# Patient Record
Sex: MALE | Race: WHITE | NOT HISPANIC OR LATINO | Employment: OTHER | URBAN - NONMETROPOLITAN AREA
[De-identification: names, ages, dates, MRNs, and addresses within clinical notes are randomized per-mention and may not be internally consistent; named-entity substitution may affect disease eponyms.]

---

## 2020-01-15 PROBLEM — I48.91 ATRIAL FIBRILLATION WITH RVR (HCC): Status: ACTIVE | Noted: 2020-01-15

## 2020-01-15 PROBLEM — D62 ACUTE BLOOD LOSS ANEMIA: Status: ACTIVE | Noted: 2020-01-15

## 2020-01-15 RX ORDER — METOPROLOL TARTRATE 100 MG/1
100 TABLET ORAL 2 TIMES DAILY
COMMUNITY
End: 2020-01-16 | Stop reason: SDUPTHER

## 2020-01-16 ENCOUNTER — OFFICE VISIT (OUTPATIENT)
Dept: CARDIOLOGY | Facility: CLINIC | Age: 65
End: 2020-01-16

## 2020-01-16 VITALS
OXYGEN SATURATION: 99 % | DIASTOLIC BLOOD PRESSURE: 71 MMHG | BODY MASS INDEX: 20.51 KG/M2 | SYSTOLIC BLOOD PRESSURE: 123 MMHG | HEIGHT: 72 IN | HEART RATE: 63 BPM | WEIGHT: 151.4 LBS

## 2020-01-16 DIAGNOSIS — E78.2 MIXED HYPERLIPIDEMIA: ICD-10-CM

## 2020-01-16 DIAGNOSIS — I48.91 ATRIAL FIBRILLATION WITH RVR (HCC): Primary | ICD-10-CM

## 2020-01-16 DIAGNOSIS — I72.8 CELIAC ARTERY ANEURYSM (HCC): ICD-10-CM

## 2020-01-16 DIAGNOSIS — I72.3 ILIAC ARTERY ANEURYSM (HCC): ICD-10-CM

## 2020-01-16 DIAGNOSIS — R00.2 PALPITATIONS: ICD-10-CM

## 2020-01-16 PROBLEM — R56.9 SEIZURES: Status: ACTIVE | Noted: 2020-01-16

## 2020-01-16 PROCEDURE — 93000 ELECTROCARDIOGRAM COMPLETE: CPT | Performed by: INTERNAL MEDICINE

## 2020-01-16 PROCEDURE — 99204 OFFICE O/P NEW MOD 45 MIN: CPT | Performed by: INTERNAL MEDICINE

## 2020-01-16 RX ORDER — METOPROLOL TARTRATE 50 MG/1
50 TABLET, FILM COATED ORAL 2 TIMES DAILY
Qty: 60 TABLET | Refills: 11 | Status: SHIPPED | OUTPATIENT
Start: 2020-01-16 | End: 2020-03-05 | Stop reason: SDUPTHER

## 2020-01-16 RX ORDER — PHENYTOIN SODIUM 100 MG/1
400 CAPSULE, EXTENDED RELEASE ORAL DAILY
COMMUNITY
Start: 2019-10-17

## 2020-01-16 RX ORDER — HYDROCODONE BITARTRATE AND ACETAMINOPHEN 5; 325 MG/1; MG/1
1 TABLET ORAL EVERY 4 HOURS PRN
COMMUNITY
Start: 2020-01-03 | End: 2020-03-05

## 2020-01-16 RX ORDER — POLYETHYLENE GLYCOL 3350 17 G/17G
17 POWDER, FOR SOLUTION ORAL DAILY
COMMUNITY

## 2020-01-16 NOTE — PROGRESS NOTES
Loli Nuñez is a 64 y.o. male     Chief Complaint   Patient presents with   • Establish Care     Here for hosp. f/u   • Atrial Fibrillation       PROBLEM LIST:     1. A-Fib, s/p spleenectomy. [id number] score 1  2. Hyperlipidemia  3. 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 Palpitations  5. Seizures                Specialty Problems        Cardiology Problems    Atrial fibrillation with RVR (CMS/[facility])                HPI:  Mr. Nuñez is a 64-year-old male patient of Dr. Garces seen today for atrial fibrillation.    The patient describes an episode of atrial fibrillation in the very distant past.  Since that time he has had only infrequent palpitations.  The patient presented to [facility] with a ruptured spleen of unknown etiology.  Radiographic imaging suggested a celiac aneurysm but this was not described in the patient's operative note.  Postoperatively the developed atrial fibrillation which was rate control and that which spontaneously converted.  Since that time he had no significant palpitations.    Mr. Nuñez denies pain, orthopnea, PND, or lower extremity edema.  He has had no dizziness, presyncope, or syncope.  He has had no symptoms of TIA or stroke and he has had no bleeding.  Chads 2 vascular 2 score is 0 (if the patient is felt to be not hypertensive) or 1 for hypertension which is now well controlled on single dose therapy.                    PRIOR MEDICATIONS    Current Outpatient Medications on File Prior to Visit   Medication Sig Dispense Refill   • aspirin 81 MG tablet Take 81 mg by mouth. Takes 4 x a week     • HYDROcodone-acetaminophen (NORCO) 5-325 MG per tablet Take 1 tablet by mouth Every 4 (Four) Hours As Needed. for pain     • metoprolol tartrate (LOPRESSOR) 100 MG tablet Take 100 mg by mouth 2 (Two) Times a Day.     • phenytoin (DILANTIN) 100 MG ER capsule Takes 400/400/300 alternating days       No current facility-administered  medications on file prior to visit.        ALLERGIES:    Doxycycline    PAST MEDICAL HISTORY:    Past Medical History:   Diagnosis Date   • Aneurysm (CMS/HCC)    • Atrial fibrillation (CMS/HCC)    • Hyperlipidemia    • Iliac artery aneurysm (CMS/HCC)        SURGICAL HISTORY:    Past Surgical History:   Procedure Laterality Date   • CARDIAC CATHETERIZATION     • SPLENECTOMY  12/30/2019   • TONSILLECTOMY AND ADENOIDECTOMY         SOCIAL HISTORY:    Social History     Socioeconomic History   • Marital status:      Spouse name: Not on file   • Number of children: Not on file   • Years of education: Not on file   • Highest education level: Not on file   Tobacco Use   • Smoking status: Current Some Day Smoker     Types: Cigarettes   • Smokeless tobacco: Former User   • Tobacco comment: smokes approx. < 1/2 PPD for approx. 30 yrs.   Substance and Sexual Activity   • Alcohol use: Yes     Comment: occas. beer   • Drug use: Never       FAMILY HISTORY:    Family History   Problem Relation Age of Onset   • Lung cancer Mother    • Osteoporosis Mother    • Heart attack Father    • Coronary artery disease Father        Review of Systems   Constitutional: Positive for fatigue. Negative for chills, diaphoresis, fever and unexpected weight change.   HENT:        Chronic sinus issues   Eyes: Positive for visual disturbance (wears glasses).   Respiratory: Negative.         Denies orthopnea/PND   Cardiovascular: Positive for palpitations (HX a-fib). Negative for chest pain and leg swelling.   Gastrointestinal: Positive for constipation. Negative for blood in stool (no melena,hematuria,hemoptysis,hematochezia).   Endocrine: Negative.    Genitourinary: Negative.    Musculoskeletal: Positive for arthralgias and myalgias.        Denies leg cramps with ambulation   Skin: Negative.    Allergic/Immunologic: Positive for environmental allergies.   Neurological: Negative.    Hematological: Negative.    Psychiatric/Behavioral: Negative.     "      VISIT VITALS:  Vitals:    01/16/20 1044   BP: 123/71   BP Location: Left arm   Patient Position: Sitting   Pulse: 63   SpO2: 99%   Weight: 68.7 kg (151 lb 6.4 oz)   Height: 182.9 cm (72\")      /71 (BP Location: Left arm, Patient Position: Sitting)   Pulse 63   Ht 182.9 cm (72\")   Wt 68.7 kg (151 lb 6.4 oz)   SpO2 99%   BMI 20.53 kg/m²     RECENT LABS:    Objective       Physical Exam   Constitutional: He is oriented to person, place, and time. He appears well-developed and well-nourished. No distress.   HENT:   Head: Normocephalic and atraumatic.   Eyes: Pupils are equal, round, and reactive to light. Conjunctivae and EOM are normal.   Neck: Normal range of motion. Neck supple. No hepatojugular reflux and no JVD present. Carotid bruit is not present. No tracheal deviation present.   Nl. Carotid upstrokes   Cardiovascular: Normal rate, regular rhythm, S1 normal, S2 normal and intact distal pulses. Exam reveals gallop and S4. Exam reveals no S3 and no friction rub.   No murmur heard.  Pulses:       Radial pulses are 2+ on the right side, and 2+ on the left side.   Pulmonary/Chest: Effort normal. He has decreased breath sounds. He has no wheezes. He has no rhonchi. He has no rales.   Nl. Expir. Phase  Minimally decreased Breath sound intensity   Abdominal: Soft. Bowel sounds are normal. He exhibits no distension, no abdominal bruit and no mass. There is no tenderness. There is no rebound and no guarding.   No organomegaly   Musculoskeletal: Normal range of motion. He exhibits no edema, tenderness or deformity.   LLE, no edema, palpable pedal pulses  RLE, no edema, palpable pedal pulses   Neurological: He is alert and oriented to person, place, and time.   Skin: Skin is warm and dry. No rash noted. No erythema. No pallor.   Psychiatric: He has a normal mood and affect. His behavior is normal. Judgment and thought content normal.   Nursing note and vitals reviewed.        ECG 12 Lead  Date/Time: " 1/16/2020 12:00 PM  Performed by: Torsten Pretty MD  Authorized by: Torsten Pretty MD   Comparison: not compared with previous ECG   Previous ECG: no previous ECG available  Comments: Normal sinus rhythm at 60 bpm.  Within normal limits.              Assessment/Plan     1. A-Fib with RVR.  The patient is currently in sinus rhythm.  I would like to perform a 30-day event monitor to assess his dysrhythmic burden.      2. Celiac Artery Aneuyrsm.  No aneurysm was described at the time of surgery.  I would defer further evaluation to Ally Perez and Dr. Jimenez.      3. Mixed Hyperlipidemia    4. Plapitations.  We will evaluate palpitations with event monitoring as above.    5.  The patient describes energy and fatigue on very high-dose beta-blocker.  We will reduce metoprolol to 50 mg twice daily and follow heart rate and blood pressures closely.       #6.  Mr. Martinez was asked to follow-up with Ally Perez and Tony as instructed, and in our office after testing or on a as needed basis for heart rate, blood pressure, or symptoms as discussed.    No follow-ups on file.         Jonah Nuñez  reports that he has been smoking cigarettes. He has quit using smokeless tobacco.. I have educated him on the risk of diseases from using tobacco products such as cancer, COPD and heart diease.     I advised him to quit and he is not willing to quit.    I spent 3  minutes counseling the patient.          Patient's Body mass index is 20.53 kg/m². BMI is within normal parameters. No follow-up required..       Natty Valladares LPN    Scribed for Dr. Torsten Pretty by Natty Valladares LPN January 16, 2020 11:42 AM         Electronically signed by:            This note is dictated utilizing voice recognition software.  Although this record has been proof read, transcriptional errors may still be present. If questions occur regarding the content of this record please do not hesitate to call our office.

## 2020-01-23 ENCOUNTER — TELEPHONE (OUTPATIENT)
Dept: CARDIOLOGY | Facility: CLINIC | Age: 65
End: 2020-01-23

## 2020-01-23 NOTE — TELEPHONE ENCOUNTER
CRITICAL EVENT MONITOR REPORTS RECEIVED IN OFFICE TODAY FROM 1- AT 4:23 AM CT AND 1- 8:50 AM CT WITH A-FIB , FLUTTER, RVR REVIEWED BY DR. CARRILLO. PER DR. CARRILLO MAKE SURE NOT HAVING ANY STROKE LIKE SX'S, IF IS NEEDS APPT. IF NOT MONITOR, SINCE A-FIB SCORE ONLY 1. 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 HE IS TO BE EVALUATED IMMEDIATELY WITH ANY CHANGE IN NEURO./CVA LIKE SX'S AND NOTIFY OUR OFFICE. VERBALIZED HE UNDERSTOOD. PH,LPN

## 2020-01-27 ENCOUNTER — OFFICE VISIT (OUTPATIENT)
Dept: CARDIOLOGY | Facility: CLINIC | Age: 65
End: 2020-01-27

## 2020-01-27 VITALS
DIASTOLIC BLOOD PRESSURE: 73 MMHG | HEIGHT: 72 IN | BODY MASS INDEX: 20.99 KG/M2 | HEART RATE: 71 BPM | SYSTOLIC BLOOD PRESSURE: 122 MMHG | OXYGEN SATURATION: 99 % | WEIGHT: 155 LBS

## 2020-01-27 DIAGNOSIS — R00.2 PALPITATIONS: ICD-10-CM

## 2020-01-27 DIAGNOSIS — I48.91 ATRIAL FIBRILLATION WITH RVR (HCC): Primary | ICD-10-CM

## 2020-01-27 DIAGNOSIS — I45.5 SINUS PAUSE: ICD-10-CM

## 2020-01-27 PROCEDURE — 99214 OFFICE O/P EST MOD 30 MIN: CPT | Performed by: NURSE PRACTITIONER

## 2020-01-27 NOTE — PATIENT INSTRUCTIONS
Steps to Quit Smoking    Smoking tobacco can be bad for your health. It can also affect almost every organ in your body. Smoking puts you and people around you at risk for many serious long-lasting (chronic) diseases. Quitting smoking is hard, but it is one of the best things that you can do for your health. It is never too late to quit.  What are the benefits of quitting smoking?  When you quit smoking, you lower your risk for getting serious diseases and conditions. They can include:  · Lung cancer or lung disease.  · Heart disease.  · Stroke.  · Heart attack.  · Not being able to have children (infertility).  · Weak bones (osteoporosis) and broken bones (fractures).  If you have coughing, wheezing, and shortness of breath, those symptoms may get better when you quit. You may also get sick less often. If you are pregnant, quitting smoking can help to lower your chances of having a baby of low birth weight.  What can I do to help me quit smoking?  Talk with your doctor about what can help you quit smoking. Some things you can do (strategies) include:  · Quitting smoking totally, instead of slowly cutting back how much you smoke over a period of time.  · Going to in-person counseling. You are more likely to quit if you go to many counseling sessions.  · Using resources and support systems, such as:  ? Online chats with a counselor.  ? Phone quitlines.  ? Printed self-help materials.  ? Support groups or group counseling.  ? Text messaging programs.  ? Mobile phone apps or applications.  · Taking medicines. Some of these medicines may have nicotine in them. If you are pregnant or breastfeeding, do not take any medicines to quit smoking unless your doctor says it is okay. Talk with your doctor about counseling or other things that can help you.  Talk with your doctor about using more than one strategy at the same time, such as taking medicines while you are also going to in-person counseling. This can help make  quitting easier.  What things can I do to make it easier to quit?  Quitting smoking might feel very hard at first, but there is a lot that you can do to make it easier. Take these steps:  · Talk to your family and friends. Ask them to support and encourage you.  · Call phone quitlines, reach out to support groups, or work with a counselor.  · Ask people who smoke to not smoke around you.  · Avoid places that make you want (trigger) to smoke, such as:  ? Bars.  ? Parties.  ? Smoke-break areas at work.  · Spend time with people who do not smoke.  · Lower the stress in your life. Stress can make you want to smoke. Try these things to help your stress:  ? Getting regular exercise.  ? Deep-breathing exercises.  ? Yoga.  ? Meditating.  ? Doing a body scan. To do this, close your eyes, focus on one area of your body at a time from head to toe, and notice which parts of your body are tense. Try to relax the muscles in those areas.  · Download or buy apps on your mobile phone or tablet that can help you stick to your quit plan. There are many free apps, such as QuitGuide from the CDC (Centers for Disease Control and Prevention). You can find more support from smokefree.gov and other websites.  This information is not intended to replace advice given to you by your health care provider. Make sure you discuss any questions you have with your health care provider.  Document Released: 10/14/2010 Document Revised: 08/15/2017 Document Reviewed: 05/03/2016  [employer] Interactive Patient Education © 2019 [employer]    Acute Coronary Syndrome    Acute coronary syndrome (ACS) is a serious problem in which there is suddenly not enough blood and oxygen reaching the heart. ACS can result in chest pain or a heart attack.  This condition is a medical emergency. If you have any symptoms of this condition, get help right away.  What are the causes?  This condition may be caused by:  · Buildup of fat and cholesterol inside of the arteries  (atherosclerosis). This is the most common cause. The buildup (plaque) can cause blood vessels in the heart (coronary arteries) to become narrow or blocked, which reduces blood flow to the heart. Plaque can also break off and lead to a clot, which can block an artery and cause a heart attack or stroke.  · Sudden tightening of the muscles around the coronary arteries (coronary spasm).  · Tearing of a coronary artery (spontaneous coronary artery dissection).  · Very low blood pressure (hypotension).  · An abnormal heartbeat (arrhythmia).  · Other medical conditions that cause a decrease of oxygen to the heart, such as anemiaorrespiratory failure.  · Using cocaine or methamphetamine.  What increases the risk?  The following factors may make you more likely to develop this condition:  · Age. The risk for ACS increases as you get older.  · History of chest pain, heart attack, peripheral artery disease, or stroke.  · Having taken chemotherapy or immune-suppressing medicines.  · Being male.  · Family history of chest pain, heart disease, or stroke.  · Smoking.  · Not exercising enough.  · Being overweight.  · High cholesterol.  · High blood pressure (hypertension).  · Diabetes.  · Excessive alcohol use.  What are the signs or symptoms?  Common symptoms of this condition include:  · Chest pain. The pain may last a long time, or it may stop and come back (recur). It may feel like:  ? Crushing or squeezing.  ? Tightness, pressure, fullness, or heaviness.  · Arm, neck, jaw, or back pain.  · Heartburn or indigestion.  · Shortness of breath.  · Nausea.  · Sudden cold sweats.  · Light-headedness.  · Dizziness, or passing out.  · Tiredness (fatigue).  Sometimes there are no symptoms.  How is this diagnosed?  This condition may be diagnosed based on:  · Your medical history and symptoms.  · An electrocardiogram (ECG). This imaging test measures the heart's electrical activity.  · Blood tests. Cardiac blood tests may need to be  repeated at designated time intervals.  · Chest X-ray.  · A CT scan of the chest.  · A coronary angiogram. This is a procedure in which dye is injected into the bloodstream and then X-rays are taken to show if there is a blockage in a coronary artery.  · Exercise stress testing.  · Echocardiography. This is a test that uses sound waves to produce detailed images of the heart.  How is this treated?  The treatment is to restore blood flow to the heart as soon as possible. Treatment for this condition may include:  · Oxygen therapy.  · Medicines, such as:  ? Antiplatelet medicines and blood-thinning medicines, such as aspirin. These help prevent blood clots.  ? Medicine that dissolves any blood clots (fibrinolytic therapy).  ? Blood pressure medicines.  ? Nitroglycerin. This helps relieve chest pain and widens blood vessels to improve blood flow.  ? Pain medicine.  ? Cholesterol-lowering medicine.  · Surgery, such as:  ? Coronary angioplasty with stent placement. This involves placing a small piece of metal that looks like mesh or a spring into a narrow coronary artery. This widens the artery and keep it open.  ? Coronary artery bypass surgery. This involves taking a section of a blood vessel from a different part of your body, and placing it on the blocked coronary artery to allow blood to flow around (bypass) the blockage.  · Cardiac rehabilitation. This is a program that helps improve your health and well-being. It includes exercise training, education, and counseling to help you recover.  Follow these instructions at home:  Eating and drinking  · Eat a heart-healthy diet that includes whole grains, fruits and vegetables, lean proteins, and low-fat or nonfat dairy products.  · Limit how much salt (sodium) you eat as told by your health care provider. Follow instructions from your health care provider about any other eating or drinking restrictions, such as limiting foods that are high in fat and processed  sugars.  · Use healthy cooking methods such as roasting, grilling, broiling, baking, poaching, steaming, or stir-frying.  · Talk with a dietitian to learn about healthy cooking methods and how to eat less sodium.  Medicines  · Take over-the-counter and prescription medicines only as told by your health care provider.  · Do not take these medicines unless your health care provider approves:  ? Vitamin supplements that contain vitamin A or vitamin E.  ? Nonsteroidal anti-inflammatory drugs (NSAIDs), such as ibuprofen, naproxen, or celecoxib.  ? Hormone replacement therapy that contains estrogen.  If you are taking blood thinners:  · Talk with your health care provider before you take any medicines that contain aspirin or NSAIDs. These medicines increase your risk for dangerous bleeding.  · Take your medicine exactly as told, at the same time every day.  · Avoid activities that could cause injury or bruising, and follow instructions about how to prevent falls.  · Wear a medical alert bracelet, and carry a card that lists what medicines you take.  Activity  · Join a cardiac rehabilitation program. An exercise plan will be developed for you.  · Ask your health care provider:  ? What activities and exercises are safe for you.  ? If you should follow specific instructions about lifting, driving, or climbing stairs.  Lifestyle  · Do not use any products that contain nicotine or tobacco, such as cigarettes and e-cigarettes. If you need help quitting, ask your health care provider.  · If your health care provider says that alcohol is safe for you, limit your alcohol intake to no more than 1 drink a day. One drink equals 12 oz of beer, 5 oz of wine, or 1½ oz of hard liquor.  · Maintain a healthy weight. If you need to lose weight, work with your health care provider to do so safely.  General instructions  · Tell all the health care providers who care for you about your heart condition, including your dentist. This may affect  the medicines or treatment you receive.  · Manage any other health conditions you have, such as hypertension or diabetes. These conditions affect your heart.  · Learn ways to manage stress.  · Get screened for depression, and get mental health treatment if you need it. People with ACS are at higher risk for depression.  · Keep your vaccinations up to date. Get the flu shot (influenza vaccine) every year.  · If directed, monitor your blood pressure at home.  · Keep all follow-up visits as told by your health care provider. This is important.  Contact a health care provider if:  · You feel overwhelmed or sad.  · You have trouble doing your daily activities.  Get help right away if:  · You have pain in your chest, neck, arm, jaw, stomach, or back that recurs, and:  ? It lasts for more than a few minutes.  ? It is not relieved by taking the medicineyour health care provider prescribed.  · You have unexplained:  ? Heavy sweating.  ? Heartburn or indigestion.  ? Nausea or vomiting.  ? Shortness of breath.  ? Difficulty breathing.  ? Fatigue.  ? Nervousness or anxiety.  ? Weakness.  ? Diarrhea.  ? Dark stools or blood in your stool.  · You have sudden light-headedness or dizziness.  · Your blood pressure is higher than 180/120.  · You faint.  · You have thoughts about hurting yourself.  These symptoms may represent a serious problem that is an emergency. Do not wait to see if the symptoms will go away. Get medical help right away. Call your local emergency services (881 in the [place] Do not drive yourself to the hospital.   If you ever feel like you may hurt yourself or others, or have thoughts about taking your own life, get help right away. You can go to your nearest emergency department or call:  · Emergency services (805 in the [place]  · A suicide crisis helpline, such as the National Suicide Prevention Lifeline at 1-223.543.7200. This is open 24 hours a day.  Summary  · Acute coronary syndrome (ACS) is when there is  not enough blood and oxygen being supplied to the heart. ACS can result in chest pain or a heart attack.  · Acute coronary syndrome is a medical emergency. If you have any symptoms of this condition, get help right away.  · Treatment includes medicines and procedures to open the blocked arteries and restore blood flow.  This information is not intended to replace advice given to you by your health care provider. Make sure you discuss any questions you have with your health care provider.  Document Released: 12/18/2006 Document Revised: 08/28/2018 Document Reviewed: 08/28/2018  [employer] Interactive Patient Education © 2019 [employer]

## 2020-01-27 NOTE — PROGRESS NOTES
Loli Nuñez is a 64 y.o. male who presents to day for Atrial Fibrillation (Here for a follow up on cardiac monitor ).    CHIEF COMPLIANT  Chief Complaint   Patient presents with   • Atrial Fibrillation     Here for a follow up on cardiac monitor        Active Problems:  Problem List Items Addressed This Visit        Cardiovascular and Mediastinum    Atrial fibrillation with RVR (CMS/[facility]) - Primary    Relevant Orders    Ambulatory Referral to Cardiac Electrophysiology    Palpitations    Relevant Orders    Ambulatory Referral to Cardiac Electrophysiology    Sinus pause    Relevant Orders    Ambulatory Referral to Cardiac Electrophysiology          HPI  HPI  Mr. Gaines is a 64-year-old male who is being followed up today for atrial fibrillation and palpitations.  Patient was called in earlier than scheduled follow-up due to a 3.2-second pause in which was noted on his cardiac event monitor.  Patient also had atrial fibrillation with RVR that was noted on the event monitor.  Additional pauses were also noted but were less than 3 seconds.  Patient states during the episode of the 3.2-second pause it sort of stunned him he said he did not really know how to describe it he did not really have any shortness of breath just had a very weird feeling he did not lose consciousness or have a syncope episode but was mildly lightheaded.  This happened during the daytime hours at approximately 1244.  Patient also reports shortness of breath but only with moderate exertion.  Never gets short of breath at rest.   Patient did have atrial fibrillation and has a Keshav Vascor of 1.  Patient states that he gets rare palpitations but nothing on a persistent basis.  Patient also reports fatigue in which he stays tired.  Patient denies any chest pain, lower extremity edema, PND, orthopnea, decreased exercise tolerance, syncope, or neurological changes.    PRIOR MEDS  Current Outpatient Medications on File Prior to Visit      Medication Sig Dispense Refill   • aspirin 81 MG tablet Take 81 mg by mouth. Takes 4 x a week     • HYDROcodone-acetaminophen (NORCO) 5-325 MG per tablet Take 1 tablet by mouth Every 4 (Four) Hours As Needed. for pain     • metoprolol tartrate (LOPRESSOR) 50 MG tablet Take 1 tablet by mouth 2 (Two) Times a Day. 60 tablet 11   • phenytoin (DILANTIN) 100 MG ER capsule Takes 400/400/300 alternating days     • polyethylene glycol (MIRALAX) packet Take 17 g by mouth Daily.       No current facility-administered medications on file prior to visit.        ALLERGIES  Doxycycline    HISTORY  Past Medical History:   Diagnosis Date   • Aneurysm (CMS/HCC)    • Atrial fibrillation (CMS/HCC)    • Hyperlipidemia    • Iliac artery aneurysm (CMS/HCC)        Social History     Socioeconomic History   • Marital status:      Spouse name: Not on file   • Number of children: Not on file   • Years of education: Not on file   • Highest education level: Not on file   Tobacco Use   • Smoking status: Current Some Day Smoker     Types: Cigarettes   • Smokeless tobacco: Former User   • Tobacco comment: smokes approx. < 1/2 PPD for approx. 30 yrs.   Substance and Sexual Activity   • Alcohol use: Yes     Comment: occas. beer   • Drug use: Never       Family History   Problem Relation Age of Onset   • Lung cancer Mother    • Osteoporosis Mother    • Heart attack Father    • Coronary artery disease Father        Review of Systems   Constitutional: Positive for fatigue (stays tired ). Negative for chills and fever.   HENT: Positive for congestion.    Eyes: Positive for visual disturbance (glasses daily ).   Respiratory: Positive for shortness of breath (SOA with moderate exertion). Negative for chest tightness.    Cardiovascular: Positive for palpitations (occasional palps ). Negative for chest pain and leg swelling.   Gastrointestinal: Negative.  Negative for abdominal pain, blood in stool, nausea and vomiting.   Endocrine: Positive for  "cold intolerance. Negative for heat intolerance.   Genitourinary: Negative.  Negative for dysuria, frequency, hematuria and urgency.   Musculoskeletal: Positive for arthralgias (hands ). Negative for back pain.   Skin: Negative.  Negative for rash and wound.   Allergic/Immunologic: Negative.  Negative for environmental allergies and food allergies.   Neurological: Positive for light-headedness (a couple of times yesterday ). Negative for dizziness and weakness.   Hematological: Negative.  Does not bruise/bleed easily.   Psychiatric/Behavioral: Negative.  Negative for sleep disturbance (denies waking with smothering ).       Objective     VITALS: /73 (BP Location: Left arm, Patient Position: Sitting)   Pulse 71   Ht 182.9 cm (72\")   Wt 70.3 kg (155 lb)   SpO2 99%   BMI 21.02 kg/m²     LABS:   Lab Results (most recent)     None          IMAGING:   No Images in the past 120 days found..    EXAM:  Physical Exam   Constitutional: He is oriented to person, place, and time. He appears well-developed and well-nourished.   HENT:   Head: Normocephalic and atraumatic.   Eyes: Pupils are equal, round, and reactive to light. EOM are normal.   Neck: Neck supple. No JVD present. Carotid bruit is not present.   Cardiovascular: Normal rate, regular rhythm, normal heart sounds and intact distal pulses. Exam reveals no gallop.   No murmur heard.  Pulses:       Carotid pulses are 2+ on the right side, and 2+ on the left side.       Radial pulses are 2+ on the right side, and 2+ on the left side.        Posterior tibial pulses are 2+ on the right side, and 2+ on the left side.   Pulmonary/Chest: Effort normal. No respiratory distress. He has decreased breath sounds.   Abdominal: Soft. Bowel sounds are normal. He exhibits no distension. There is no tenderness.   Musculoskeletal: Normal range of motion. He exhibits no edema.   Neurological: He is alert and oriented to person, place, and time. He has normal strength. No cranial " nerve deficit or sensory deficit.   Skin: Skin is warm, dry and intact.   Psychiatric: He has a normal mood and affect. His speech is normal and behavior is normal. Judgment and thought content normal. Cognition and memory are normal.   Nursing note and vitals reviewed.      Procedure   Procedures       Assessment/Plan    Diagnosis Plan   1. Atrial fibrillation with RVR (CMS/HCC)  Ambulatory Referral to Cardiac Electrophysiology   2. Palpitations  Ambulatory Referral to Cardiac Electrophysiology   3. Sinus pause  Ambulatory Referral to Cardiac Electrophysiology   1.  Patient had a Keshav Vas 2 score of 1 and does have a history of atrial fibrillation.  Did discuss anticoagulation therapy and and due to a score of 1 would not initiate anticoagulation therapy at this time.  2.  Patient did have a 3.2-second pause during daytime hours in which he did become symptomatic with but not to the point of syncope.  Due to this I think it is appropriate to refer patient to Dr. Mary for evaluation of pacemaker placement.  Patient agreed.  3.  Patient otherwise relatively asymptomatic other than some shortness of air with moderate exertion and persistent fatigue.  No medication changes are warranted at this time.  We will continue metoprolol therapy due to the fact that patient has significant tachycardia as well as the pulse.  4.  Informed of signs and symptoms of ACS and advised to seek emergent treatment for any new worsening symptoms.  Patient also advised sooner follow-up as needed.    Return in about 4 weeks (around 2/24/2020), or if symptoms worsen or fail to improve.    Jonah was seen today for atrial fibrillation.    Diagnoses and all orders for this visit:    Atrial fibrillation with RVR (CMS/HCC)  -     Ambulatory Referral to Cardiac Electrophysiology    Palpitations  -     Ambulatory Referral to Cardiac Electrophysiology    Sinus pause  -     Ambulatory Referral to Cardiac Electrophysiology        Jonah Nuñez  reports  that he has been smoking cigarettes. He has quit using smokeless tobacco.. I have educated him on the risk of diseases from using tobacco products such as cancer, COPD and heart diease.     I advised him to quit and he is not willing to quit.          Patient's Body mass index is 21.02 kg/m². BMI is within normal parameters. No follow-up required..           MEDS ORDERED DURING VISIT:  No orders of the defined types were placed in this encounter.          This document has been electronically signed by Brandon Ochoa Jr., APRRODOLFO  January 29, 2020 12:21 AM

## 2020-01-29 ENCOUNTER — TELEPHONE (OUTPATIENT)
Dept: CARDIOLOGY | Facility: CLINIC | Age: 65
End: 2020-01-29

## 2020-01-29 NOTE — TELEPHONE ENCOUNTER
Informed patient per ANEL PANDYA, he may discontinue wearing monitor. Patient verbalized understanding. Caryl Amayatead LPN    ---- Message from Pat Gates sent at 1/29/2020  2:49 PM EST -----  Pt call and stated he saw Dr. Mary today and is scheduled for a pacemaker tomorrow.     States that per Dr. Mary the 4 week heart monitor isnt needed. Pt states that he feels the monitor is still needed because JR needs info about his AFiB. Pt states he needs to know what to do.   #[phone] okay to leave a detailed message

## 2020-02-06 ENCOUNTER — CLINICAL SUPPORT (OUTPATIENT)
Dept: CARDIOLOGY | Facility: CLINIC | Age: 65
End: 2020-02-06

## 2020-02-06 VITALS
HEIGHT: 72 IN | HEART RATE: 72 BPM | DIASTOLIC BLOOD PRESSURE: 70 MMHG | BODY MASS INDEX: 21.13 KG/M2 | OXYGEN SATURATION: 99 % | WEIGHT: 156 LBS | SYSTOLIC BLOOD PRESSURE: 112 MMHG

## 2020-02-06 DIAGNOSIS — R07.2 PRECORDIAL PAIN: Primary | ICD-10-CM

## 2020-02-06 NOTE — PROGRESS NOTES
Jonah Nuñez  1955 2/6/2020   ?   Chief Complaint   Patient presents with   • Wound Check      ?   HPI:   ? Here for a wound check. Had pacemaker inserted in upper left chest on 1/30/20 by Dr. Mary. States he had quite a bit of discomfort until yesterday and it feels much better today. Continues to wear sling for left arm. Steri strips in place. No redness, no drainage.  DENNYS ROGEL  ?   ?     Current Outpatient Medications:   •  aspirin 81 MG tablet, Take 81 mg by mouth. Takes 4 x a week, Disp: , Rfl:   •  HYDROcodone-acetaminophen (NORCO) 5-325 MG per tablet, Take 1 tablet by mouth Every 4 (Four) Hours As Needed. for pain, Disp: , Rfl:   •  metoprolol tartrate (LOPRESSOR) 50 MG tablet, Take 1 tablet by mouth 2 (Two) Times a Day., Disp: 60 tablet, Rfl: 11  •  phenytoin (DILANTIN) 100 MG ER capsule, Takes 400/400/300 alternating days, Disp: , Rfl:   •  polyethylene glycol (MIRALAX) packet, Take 17 g by mouth Daily., Disp: , Rfl:    ?   ?   Doxycycline       Procedures     ?   Assessment/Plan    ?    1. Wound check   ?   ?     Per verbal order from STAN Torres patient needs to continue to use the sling for 4 weeks as directed by Dr. Mary's office. He may shower and get the area wet, but just needs to pat it dry afterwards. He should let the steri strips fall off naturally. 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 He said he will be released from that surgery around the 24th of February. Wanted to know about getting work excuse for that last week of February since he will still need to be using the sling at that time. OK per Roger to give work excuse to return to work on 3/2/20. He will keep f/u as scheduled for JR Ochoa and marielena check and call or come in sooner as needed.  DENNYS ROGEL

## 2020-02-20 ENCOUNTER — OUTSIDE FACILITY SERVICE (OUTPATIENT)
Dept: CARDIOLOGY | Facility: CLINIC | Age: 65
End: 2020-02-20

## 2020-02-20 PROCEDURE — 93272 ECG/REVIEW INTERPRET ONLY: CPT | Performed by: INTERNAL MEDICINE

## 2020-03-05 ENCOUNTER — OFFICE VISIT (OUTPATIENT)
Dept: CARDIOLOGY | Facility: CLINIC | Age: 65
End: 2020-03-05

## 2020-03-05 VITALS
HEART RATE: 101 BPM | WEIGHT: 159 LBS | SYSTOLIC BLOOD PRESSURE: 134 MMHG | HEIGHT: 72 IN | BODY MASS INDEX: 21.54 KG/M2 | DIASTOLIC BLOOD PRESSURE: 80 MMHG | OXYGEN SATURATION: 97 %

## 2020-03-05 DIAGNOSIS — R00.2 PALPITATIONS: ICD-10-CM

## 2020-03-05 DIAGNOSIS — I72.8 CELIAC ARTERY ANEURYSM (HCC): ICD-10-CM

## 2020-03-05 DIAGNOSIS — I48.91 ATRIAL FIBRILLATION, UNSPECIFIED TYPE (HCC): Primary | ICD-10-CM

## 2020-03-05 PROCEDURE — 99213 OFFICE O/P EST LOW 20 MIN: CPT | Performed by: NURSE PRACTITIONER

## 2020-03-05 PROCEDURE — 93000 ELECTROCARDIOGRAM COMPLETE: CPT | Performed by: NURSE PRACTITIONER

## 2020-03-05 RX ORDER — METOPROLOL SUCCINATE 100 MG/1
100 TABLET, EXTENDED RELEASE ORAL DAILY
Qty: 30 TABLET | Refills: 11 | Status: SHIPPED | OUTPATIENT
Start: 2020-03-05 | End: 2020-05-27 | Stop reason: SDUPTHER

## 2020-03-05 NOTE — PATIENT INSTRUCTIONS
Steps to Quit Smoking    Smoking tobacco can be bad for your health. It can also affect almost every organ in your body. Smoking puts you and people around you at risk for many serious long-lasting (chronic) diseases. Quitting smoking is hard, but it is one of the best things that you can do for your health. It is never too late to quit.  What are the benefits of quitting smoking?  When you quit smoking, you lower your risk for getting serious diseases and conditions. They can include:  · Lung cancer or lung disease.  · Heart disease.  · Stroke.  · Heart attack.  · Not being able to have children (infertility).  · Weak bones (osteoporosis) and broken bones (fractures).  If you have coughing, wheezing, and shortness of breath, those symptoms may get better when you quit. You may also get sick less often. If you are pregnant, quitting smoking can help to lower your chances of having a baby of low birth weight.  What can I do to help me quit smoking?  Talk with your doctor about what can help you quit smoking. Some things you can do (strategies) include:  · Quitting smoking totally, instead of slowly cutting back how much you smoke over a period of time.  · Going to in-person counseling. You are more likely to quit if you go to many counseling sessions.  · Using resources and support systems, such as:  ? Online chats with a counselor.  ? Phone quitlines.  ? Printed self-help materials.  ? Support groups or group counseling.  ? Text messaging programs.  ? Mobile phone apps or applications.  · Taking medicines. Some of these medicines may have nicotine in them. If you are pregnant or breastfeeding, do not take any medicines to quit smoking unless your doctor says it is okay. Talk with your doctor about counseling or other things that can help you.  Talk with your doctor about using more than one strategy at the same time, such as taking medicines while you are also going to in-person counseling. This can help make  quitting easier.  What things can I do to make it easier to quit?  Quitting smoking might feel very hard at first, but there is a lot that you can do to make it easier. Take these steps:  · Talk to your family and friends. Ask them to support and encourage you.  · Call phone quitlines, reach out to support groups, or work with a counselor.  · Ask people who smoke to not smoke around you.  · Avoid places that make you want (trigger) to smoke, such as:  ? Bars.  ? Parties.  ? Smoke-break areas at work.  · Spend time with people who do not smoke.  · Lower the stress in your life. Stress can make you want to smoke. Try these things to help your stress:  ? Getting regular exercise.  ? Deep-breathing exercises.  ? Yoga.  ? Meditating.  ? Doing a body scan. To do this, close your eyes, focus on one area of your body at a time from head to toe, and notice which parts of your body are tense. Try to relax the muscles in those areas.  · Download or buy apps on your mobile phone or tablet that can help you stick to your quit plan. There are many free apps, such as QuitGuide from the CDC (Centers for Disease Control and Prevention). You can find more support from smokefree.gov and other websites.  This information is not intended to replace advice given to you by your health care provider. Make sure you discuss any questions you have with your health care provider.  Document Released: 10/14/2010 Document Revised: 08/15/2017 Document Reviewed: 05/03/2016  [employer] Interactive Patient Education © 2020 [employer]    Acute Coronary Syndrome    Acute coronary syndrome (ACS) is a serious problem in which there is suddenly not enough blood and oxygen reaching the heart. ACS can result in chest pain or a heart attack.  This condition is a medical emergency. If you have any symptoms of this condition, get help right away.  What are the causes?  This condition may be caused by:  · Buildup of fat and cholesterol inside of the arteries  (atherosclerosis). This is the most common cause. The buildup (plaque) can cause blood vessels in the heart (coronary arteries) to become narrow or blocked, which reduces blood flow to the heart. Plaque can also break off and lead to a clot, which can block an artery and cause a heart attack or stroke.  · Sudden tightening of the muscles around the coronary arteries (coronary spasm).  · Tearing of a coronary artery (spontaneous coronary artery dissection).  · Very low blood pressure (hypotension).  · An abnormal heartbeat (arrhythmia).  · Other medical conditions that cause a decrease of oxygen to the heart, such as anemiaorrespiratory failure.  · Using cocaine or methamphetamine.  What increases the risk?  The following factors may make you more likely to develop this condition:  · Age. The risk for ACS increases as you get older.  · History of chest pain, heart attack, peripheral artery disease, or stroke.  · Having taken chemotherapy or immune-suppressing medicines.  · Being male.  · Family history of chest pain, heart disease, or stroke.  · Smoking.  · Not exercising enough.  · Being overweight.  · High cholesterol.  · High blood pressure (hypertension).  · Diabetes.  · Excessive alcohol use.  What are the signs or symptoms?  Common symptoms of this condition include:  · Chest pain. The pain may last a long time, or it may stop and come back (recur). It may feel like:  ? Crushing or squeezing.  ? Tightness, pressure, fullness, or heaviness.  · Arm, neck, jaw, or back pain.  · Heartburn or indigestion.  · Shortness of breath.  · Nausea.  · Sudden cold sweats.  · Light-headedness.  · Dizziness, or passing out.  · Tiredness (fatigue).  Sometimes there are no symptoms.  How is this diagnosed?  This condition may be diagnosed based on:  · Your medical history and symptoms.  · An electrocardiogram (ECG). This imaging test measures the heart's electrical activity.  · Blood tests. Cardiac blood tests may need to be  repeated at designated time intervals.  · Chest X-ray.  · A CT scan of the chest.  · A coronary angiogram. This is a procedure in which dye is injected into the bloodstream and then X-rays are taken to show if there is a blockage in a coronary artery.  · Exercise stress testing.  · Echocardiography. This is a test that uses sound waves to produce detailed images of the heart.  How is this treated?  The treatment is to restore blood flow to the heart as soon as possible. Treatment for this condition may include:  · Oxygen therapy.  · Medicines, such as:  ? Antiplatelet medicines and blood-thinning medicines, such as aspirin. These help prevent blood clots.  ? Medicine that dissolves any blood clots (fibrinolytic therapy).  ? Blood pressure medicines.  ? Nitroglycerin. This helps relieve chest pain and widens blood vessels to improve blood flow.  ? Pain medicine.  ? Cholesterol-lowering medicine.  · Surgery, such as:  ? Coronary angioplasty with stent placement. This involves placing a small piece of metal that looks like mesh or a spring into a narrow coronary artery. This widens the artery and keep it open.  ? Coronary artery bypass surgery. This involves taking a section of a blood vessel from a different part of your body, and placing it on the blocked coronary artery to allow blood to flow around (bypass) the blockage.  · Cardiac rehabilitation. This is a program that helps improve your health and well-being. It includes exercise training, education, and counseling to help you recover.  Follow these instructions at home:  Eating and drinking  · Eat a heart-healthy diet that includes whole grains, fruits and vegetables, lean proteins, and low-fat or nonfat dairy products.  · Limit how much salt (sodium) you eat as told by your health care provider. Follow instructions from your health care provider about any other eating or drinking restrictions, such as limiting foods that are high in fat and processed  sugars.  · Use healthy cooking methods such as roasting, grilling, broiling, baking, poaching, steaming, or stir-frying.  · Talk with a dietitian to learn about healthy cooking methods and how to eat less sodium.  Medicines  · Take over-the-counter and prescription medicines only as told by your health care provider.  · Do not take these medicines unless your health care provider approves:  ? Vitamin supplements that contain vitamin A or vitamin E.  ? Nonsteroidal anti-inflammatory drugs (NSAIDs), such as ibuprofen, naproxen, or celecoxib.  ? Hormone replacement therapy that contains estrogen.  If you are taking blood thinners:  · Talk with your health care provider before you take any medicines that contain aspirin or NSAIDs. These medicines increase your risk for dangerous bleeding.  · Take your medicine exactly as told, at the same time every day.  · Avoid activities that could cause injury or bruising, and follow instructions about how to prevent falls.  · Wear a medical alert bracelet, and carry a card that lists what medicines you take.  Activity  · Join a cardiac rehabilitation program. An exercise plan will be developed for you.  · Ask your health care provider:  ? What activities and exercises are safe for you.  ? If you should follow specific instructions about lifting, driving, or climbing stairs.  Lifestyle  · Do not use any products that contain nicotine or tobacco, such as cigarettes and e-cigarettes. If you need help quitting, ask your health care provider.  · If your health care provider says that alcohol is safe for you, limit your alcohol intake to no more than 1 drink a day. One drink equals 12 oz of beer, 5 oz of wine, or 1½ oz of hard liquor.  · Maintain a healthy weight. If you need to lose weight, work with your health care provider to do so safely.  General instructions  · Tell all the health care providers who care for you about your heart condition, including your dentist. This may affect  the medicines or treatment you receive.  · Manage any other health conditions you have, such as hypertension or diabetes. These conditions affect your heart.  · Learn ways to manage stress.  · Get screened for depression, and get mental health treatment if you need it. People with ACS are at higher risk for depression.  · Keep your vaccinations up to date. Get the flu shot (influenza vaccine) every year.  · If directed, monitor your blood pressure at home.  · Keep all follow-up visits as told by your health care provider. This is important.  Contact a health care provider if:  · You feel overwhelmed or sad.  · You have trouble doing your daily activities.  Get help right away if:  · You have pain in your chest, neck, arm, jaw, stomach, or back that recurs, and:  ? It lasts for more than a few minutes.  ? It is not relieved by taking the medicineyour health care provider prescribed.  · You have unexplained:  ? Heavy sweating.  ? Heartburn or indigestion.  ? Nausea or vomiting.  ? Shortness of breath.  ? Difficulty breathing.  ? Fatigue.  ? Nervousness or anxiety.  ? Weakness.  ? Diarrhea.  ? Dark stools or blood in your stool.  · You have sudden light-headedness or dizziness.  · Your blood pressure is higher than 180/120.  · You faint.  · You have thoughts about hurting yourself.  These symptoms may represent a serious problem that is an emergency. Do not wait to see if the symptoms will go away. Get medical help right away. Call your local emergency services (481 in the [place] Do not drive yourself to the hospital.   If you ever feel like you may hurt yourself or others, or have thoughts about taking your own life, get help right away. You can go to your nearest emergency department or call:  · Emergency services (682 in the [place]  · A suicide crisis helpline, such as the National Suicide Prevention Lifeline at 1-687.135.2603. This is open 24 hours a day.  Summary  · Acute coronary syndrome (ACS) is when there is  not enough blood and oxygen being supplied to the heart. ACS can result in chest pain or a heart attack.  · Acute coronary syndrome is a medical emergency. If you have any symptoms of this condition, get help right away.  · Treatment includes medicines and procedures to open the blocked arteries and restore blood flow.  This information is not intended to replace advice given to you by your health care provider. Make sure you discuss any questions you have with your health care provider.  Document Released: 12/18/2006 Document Revised: 08/28/2018 Document Reviewed: 08/28/2018  [employer] Interactive Patient Education © 2019 [employer]

## 2020-03-05 NOTE — PROGRESS NOTES
Subjective     Jonah Nuñez is a 64 y.o. male who presents to day for Atrial Fibrillation (Dual Chamber Pacer 1-30-20 Dr. Mary).    CHIEF COMPLIANT  Chief Complaint   Patient presents with   • Atrial Fibrillation     Dual Chamber Pacer 1-30-20 Dr. Mary       Active Problems:  Problem List Items Addressed This Visit        Cardiovascular and Mediastinum    Palpitations    Relevant Medications    metoprolol succinate XL (TOPROL XL) 100 MG 24 hr tablet    Celiac artery aneurysm (CMS/HCC)    Relevant Orders    CT angiogram abdomen pelvis w wo contrast      Other Visit Diagnoses     Atrial fibrillation, unspecified type (CMS/HCC)    -  Primary    Relevant Medications    metoprolol succinate XL (TOPROL XL) 100 MG 24 hr tablet    Other Relevant Orders    ECG 12 Lead          HPI  HPI  Mr. Gurrola who is a 64-year-old male being seen today for follow-up status post dual-chamber pacemaker placement by Dr. Mary on 1/30/2020.  Patient states that he has been doing relatively well since the pacemaker insertion.  However he does feel like he is having more episodes of atrial fibrillation in which he describes as a fast irregular heartbeat.  There is been no obvious aggravating or relieving factors, associated symptoms, and is usually self-limiting.  Also reports fatigue when the atrial fibrillation increases.  Denies any chest pain, lower extremity edema, PND, orthopnea, syncope, shortness of breath, or neurological changes.  PRIOR MEDS  Current Outpatient Medications on File Prior to Visit   Medication Sig Dispense Refill   • aspirin 81 MG tablet Take 81 mg by mouth. Takes 4 x a week     • phenytoin (DILANTIN) 100 MG ER capsule Takes 400/400/300 alternating days     • polyethylene glycol (MIRALAX) packet Take 17 g by mouth Daily.       No current facility-administered medications on file prior to visit.        ALLERGIES  Doxycycline    HISTORY  Past Medical History:   Diagnosis Date   • Aneurysm (CMS/HCC)    • Atrial  fibrillation (CMS/HCC)    • Hyperlipidemia    • Iliac artery aneurysm (CMS/HCC)        Social History     Socioeconomic History   • Marital status:      Spouse name: Not on file   • Number of children: Not on file   • Years of education: Not on file   • Highest education level: Not on file   Tobacco Use   • Smoking status: Current Some Day Smoker     Types: Cigarettes   • Smokeless tobacco: Former User   • Tobacco comment: smokes approx. < 1/2 PPD for approx. 30 yrs.   Substance and Sexual Activity   • Alcohol use: Yes     Comment: occas. beer   • Drug use: Never       Family History   Problem Relation Age of Onset   • Lung cancer Mother    • Osteoporosis Mother    • Heart attack Father    • Coronary artery disease Father        Review of Systems   Constitutional: Positive for fatigue (with afib). Negative for chills and fever.   HENT: Negative.  Negative for congestion.    Eyes: Positive for visual disturbance (glasses).   Respiratory: Negative.  Negative for chest tightness and shortness of breath.    Cardiovascular: Positive for palpitations. Negative for chest pain and leg swelling.   Gastrointestinal: Negative.  Negative for abdominal pain, constipation, diarrhea, nausea and vomiting.   Endocrine: Negative.  Negative for cold intolerance and heat intolerance.   Genitourinary: Negative.  Negative for dysuria, frequency, hematuria and urgency.   Musculoskeletal: Negative.  Negative for back pain and neck pain.   Skin: Negative.  Negative for rash and wound.   Allergic/Immunologic: Positive for environmental allergies (seasonal). Negative for food allergies.   Neurological: Negative.  Negative for dizziness, syncope and light-headedness.   Hematological: Bruises/bleeds easily (bruises).   Psychiatric/Behavioral: Negative.  Negative for sleep disturbance (denies problems sleeping, denies waking with soa or cp).       Objective     VITALS: /80 (BP Location: Left arm, Patient Position: Sitting)   Pulse  "101   Ht 182.9 cm (72\")   Wt 72.1 kg (159 lb)   SpO2 97%   BMI 21.56 kg/m²     LABS:   Lab Results (most recent)     None          IMAGING:   No Images in the past 120 days found..    EXAM:  Physical Exam   Constitutional: He is oriented to person, place, and time. He appears well-developed and well-nourished.   HENT:   Head: Normocephalic and atraumatic.   Eyes: Pupils are equal, round, and reactive to light. EOM are normal.   Neck: Neck supple. No JVD present. Carotid bruit is not present.   Cardiovascular: Normal rate, regular rhythm, normal heart sounds and intact distal pulses. Exam reveals no gallop.   No murmur heard.  Pulses:       Carotid pulses are 2+ on the right side, and 2+ on the left side.       Radial pulses are 2+ on the right side, and 2+ on the left side.        Posterior tibial pulses are 2+ on the right side, and 2+ on the left side.   Pulmonary/Chest: Effort normal and breath sounds normal. No respiratory distress.   Abdominal: Soft. Bowel sounds are normal. He exhibits no distension. There is no tenderness.   Musculoskeletal: Normal range of motion. He exhibits no edema.   Neurological: He is alert and oriented to person, place, and time. He has normal strength. No cranial nerve deficit or sensory deficit.   Skin: Skin is warm, dry and intact.   Psychiatric: He has a normal mood and affect. His speech is normal and behavior is normal. Judgment and thought content normal. Cognition and memory are normal.   Nursing note and vitals reviewed.      Procedure     ECG 12 Lead  Date/Time: 3/5/2020 10:00 AM  Performed by: Brandon Ochoa APRN  Authorized by: Brandon Ochoa APRN   Comparison: compared with previous ECG from 1/16/2020  Comparison to previous ECG: Status post pacemaker patient now is atrially paced  Rate: normal  BPM: 70  QRS axis: normal  Pacing: atrial sensed rhythm             Assessment/Plan    Diagnosis Plan   1. Atrial fibrillation, unspecified type (CMS/HCC)  ECG 12 Lead    " metoprolol succinate XL (TOPROL XL) 100 MG 24 hr tablet   2. Palpitations  metoprolol succinate XL (TOPROL XL) 100 MG 24 hr tablet   3. Celiac artery aneurysm (CMS/HCC)  CT angiogram abdomen pelvis w wo contrast   Patient is doing well status post pacemaker placement without any major complaint except for increasing atrial fibrillation where his heart rate becomes fast and irregular.  Due to this I will have the [employer] rep come tomorrow to reevaluate his pacemaker to see exact atrial fibrillation burden.  In the meantime I will increase his metoprolol to 100 mg daily.  2.  Patient is concerned about a celiac artery they found during CT of the abdomen and pelvis.  It was 8 mm at that time.  Patient verbalized that all of his family  in their early 60s and he is now that age.  He is very concerned and we will order a CTA of the abdomen and pelvis to determine if the aneurysm is growing significantly.  If there is significant increase in size will consider going ahead and referring to vascular surgery for potential intervention.  3.  Patient advised to monitor his blood pressure on a daily basis and report any significant highs or lows.  4.  Informed of signs and symptoms of ACS and advised to seek emergent treatment for any new worsening symptoms.  Patient also advised sooner follow-up as needed.  Also advised to follow-up with family doctor as needed    Return in about 3 months (around 2020), or if symptoms worsen or fail to improve.    Jonah was seen today for atrial fibrillation.    Diagnoses and all orders for this visit:    Atrial fibrillation, unspecified type (CMS/HCC)  -     ECG 12 Lead  -     metoprolol succinate XL (TOPROL XL) 100 MG 24 hr tablet; Take 1 tablet by mouth Daily.    Palpitations  -     metoprolol succinate XL (TOPROL XL) 100 MG 24 hr tablet; Take 1 tablet by mouth Daily.    Celiac artery aneurysm (CMS/HCC)  -     CT angiogram abdomen pelvis w wo contrast; Future        Cy  Savannah  reports that he has been smoking cigarettes. He has quit using smokeless tobacco.. I have educated him on the risk of diseases from using tobacco products such as cancer, COPD and heart diease.     I advised him to quit and he is willing to quit. We have discussed the following method/s for tobacco cessation:  Counseling.     I spent 3  minutes counseling the patient.           Patient's Body mass index is 21.56 kg/m². BMI is within normal parameters. No follow-up required..           MEDS ORDERED DURING VISIT:  New Medications Ordered This Visit   Medications   • metoprolol succinate XL (TOPROL XL) 100 MG 24 hr tablet     Sig: Take 1 tablet by mouth Daily.     Dispense:  30 tablet     Refill:  11           This document has been electronically signed by Brandon Ochoa Jr., APRN  March 6, 2020 7:37 AM

## 2020-03-06 ENCOUNTER — CLINICAL SUPPORT (OUTPATIENT)
Dept: CARDIOLOGY | Facility: CLINIC | Age: 65
End: 2020-03-06

## 2020-03-06 DIAGNOSIS — I48.91 ATRIAL FIBRILLATION, UNSPECIFIED TYPE (HCC): Primary | ICD-10-CM

## 2020-03-06 DIAGNOSIS — I49.5 SICK SINUS SYNDROME (HCC): Primary | ICD-10-CM

## 2020-03-06 PROCEDURE — 93280 PM DEVICE PROGR EVAL DUAL: CPT | Performed by: INTERNAL MEDICINE

## 2020-03-06 RX ORDER — FLECAINIDE ACETATE 50 MG/1
50 TABLET ORAL 2 TIMES DAILY
Qty: 60 TABLET | Refills: 11 | Status: SHIPPED | OUTPATIENT
Start: 2020-03-06 | End: 2020-09-25 | Stop reason: SDUPTHER

## 2020-03-08 NOTE — PROGRESS NOTES
Patient did not go under pacemaker check this morning to further evaluate his complaints of palpitations.  He did have 156 non-SVT episodes which appears to be A. fib with RVR.  1066 mode switches with the max greater than 48 hours with a 22% total.  He does seem to be having a significant A. fib burden.  He is now noticing more palpitations.  Did discuss potential use of antiarrhythmics in which he agrees to move forth with.  I will start patient on flecainide on Sunday.  He will return for consecutive EKGs on Monday, Tuesday, Wednesday to evaluate the QTC and toleration of the antiarrhythmic.  Also discussed anticoagulation for atrial fibrillation with patient in which he is going think about.  Patient does have a Keshav Vasc scor of 0 at this time.  However in approximately 6 months he will have a Keshav Vasc score of 1 due to age.  He has not been evaluated for ischemia and which will be our next steps.  He has no signs of failure such as shortness of breath or lower extremity edema therefore I think it is appropriate to move forth with the flecainide.

## 2020-03-09 ENCOUNTER — CLINICAL SUPPORT (OUTPATIENT)
Dept: CARDIOLOGY | Facility: CLINIC | Age: 65
End: 2020-03-09

## 2020-03-09 VITALS
HEART RATE: 66 BPM | DIASTOLIC BLOOD PRESSURE: 74 MMHG | SYSTOLIC BLOOD PRESSURE: 121 MMHG | HEIGHT: 72 IN | BODY MASS INDEX: 21.81 KG/M2 | WEIGHT: 161 LBS | OXYGEN SATURATION: 98 %

## 2020-03-09 DIAGNOSIS — I48.19 ATRIAL FIBRILLATION, PERSISTENT (HCC): Primary | ICD-10-CM

## 2020-03-09 PROCEDURE — 93000 ELECTROCARDIOGRAM COMPLETE: CPT | Performed by: NURSE PRACTITIONER

## 2020-03-09 RX ORDER — METOPROLOL SUCCINATE 100 MG/1
100 TABLET, EXTENDED RELEASE ORAL 2 TIMES DAILY
COMMUNITY
Start: 2020-03-05 | End: 2020-05-27 | Stop reason: SDUPTHER

## 2020-03-09 RX ORDER — FLECAINIDE ACETATE 50 MG/1
TABLET ORAL
COMMUNITY
Start: 2020-03-06 | End: 2020-03-11 | Stop reason: SDUPTHER

## 2020-03-09 NOTE — PROGRESS NOTES
"Jonah Nuñez  1955  3/9/2020   ?   Chief Complaint   Patient presents with   • Nurse visit     Flecainide therapy      ?   HPI:   ? Patient presents day for first ekg after starting Flecainide 50 mg po bid. Patient has no complaints today, reports feeling \"fine\". EKG to be reviewed by ANEL PANDYA.  ?   ?     Current Outpatient Medications:   •  aspirin 81 MG tablet, Take 81 mg by mouth. Takes 4 x a week, Disp: , Rfl:   •  flecainide (TAMBOCOR) 50 MG tablet, Take 1 tablet by mouth 2 (Two) Times a Day., Disp: 60 tablet, Rfl: 11  •  metoprolol succinate XL (TOPROL XL) 100 MG 24 hr tablet, Take 1 tablet by mouth Daily., Disp: 30 tablet, Rfl: 11  •  metoprolol succinate XL (TOPROL-XL) 100 MG 24 hr tablet, Take 100 mg by mouth 2 (Two) Times a Day., Disp: , Rfl:   •  phenytoin (DILANTIN) 100 MG ER capsule, Takes 400/400/300 alternating days, Disp: , Rfl:   •  polyethylene glycol (MIRALAX) packet, Take 17 g by mouth Daily., Disp: , Rfl:   •  flecainide (TAMBOCOR) 50 MG tablet, , Disp: , Rfl:    ?   ?   Doxycycline         ECG 12 Lead  Date/Time: 3/9/2020 8:52 AM  Performed by: Brandon Ochoa APRN  Authorized by: Brandon Ochoa APRN   Comparison: compared with previous ECG from 3/5/2020  Comments: Patient started flecainide day 1 EKG.  Patient is atrially paced, normal axis, rate of 66, with no acute ST or T wave changes.  Will reevaluate tomorrow.             ?   Assessment/Plan    ?   1. Atrial Fibrillation     EKG reviewed by ANEL PANDYA, no changes made. Patient to return to office tomorrow for second ekg. Patient verbalized understanding. Caryl Lopez LPN?   I have a read of the above and agree with the documentation we will continue to monitor EKGs for the next 2 days to monitor the QTC status post flecainide therapy.  ?       "

## 2020-03-10 ENCOUNTER — CLINICAL SUPPORT (OUTPATIENT)
Dept: CARDIOLOGY | Facility: CLINIC | Age: 65
End: 2020-03-10

## 2020-03-10 VITALS
HEART RATE: 66 BPM | DIASTOLIC BLOOD PRESSURE: 68 MMHG | HEIGHT: 72 IN | SYSTOLIC BLOOD PRESSURE: 117 MMHG | WEIGHT: 162 LBS | BODY MASS INDEX: 21.94 KG/M2 | OXYGEN SATURATION: 98 %

## 2020-03-10 DIAGNOSIS — Z79.899 MEDICATION THERAPY CHANGED: Primary | ICD-10-CM

## 2020-03-10 PROCEDURE — 93000 ELECTROCARDIOGRAM COMPLETE: CPT | Performed by: NURSE PRACTITIONER

## 2020-03-10 NOTE — PROGRESS NOTES
Jonah Savannah  1955  3/10/2020   ?   Chief Complaint   Patient presents with   • Nurse Visit     EKG      ?   HPI:   ? Patient presents for day two ekg, for flecainide therapy. Patient has no complaints today. EKG to be reviewed by ANEL PANDYA. ?   ?     Current Outpatient Medications:   •  aspirin 81 MG tablet, Take 81 mg by mouth. Takes 4 x a week, Disp: , Rfl:   •  flecainide (TAMBOCOR) 50 MG tablet, Take 1 tablet by mouth 2 (Two) Times a Day., Disp: 60 tablet, Rfl: 11  •  flecainide (TAMBOCOR) 50 MG tablet, , Disp: , Rfl:   •  metoprolol succinate XL (TOPROL XL) 100 MG 24 hr tablet, Take 1 tablet by mouth Daily., Disp: 30 tablet, Rfl: 11  •  metoprolol succinate XL (TOPROL-XL) 100 MG 24 hr tablet, Take 100 mg by mouth 2 (Two) Times a Day., Disp: , Rfl:   •  phenytoin (DILANTIN) 100 MG ER capsule, Takes 400/400/300 alternating days, Disp: , Rfl:   •  polyethylene glycol (MIRALAX) packet, Take 17 g by mouth Daily., Disp: , Rfl:    ?   ?   Doxycycline         ECG 12 Lead  Date/Time: 3/10/2020 8:21 AM  Performed by: Brandon Ochoa APRN  Authorized by: Brandon Ochoa APRN   Comparison: compared with previous ECG from 3/9/2020  Rhythm: sinus rhythm  Ectopy: atrial premature contractions  Rate: normal  BPM: 66  QRS axis: normal    Clinical impression: non-specific ECG  Comments: Previously was atrial paced.  QTC on day 2 of flecainide is 409 previously 433.             ?   Assessment/Plan    ?   ?1.  Atrial Fibrillation  ?   EKG reviewed by ANEL PANDYA, he went in to speak with patient. No changes made today. Patient to continue Flecainide 50 mg po bid. Return to office tomorrow for third EKG. Patient verbalized understanding. Caryl Lopez LPN    I have reviewed the EKG and the notes above.  I did speak with patient and he is asymptomatic and verbalizes that he has not felt any of the atrial fibrillation since initiation of the flecainide.  Patient does have a scheduled echocardiogram for evaluation  of his ejection fraction.

## 2020-03-11 ENCOUNTER — CLINICAL SUPPORT (OUTPATIENT)
Dept: CARDIOLOGY | Facility: CLINIC | Age: 65
End: 2020-03-11

## 2020-03-11 VITALS
SYSTOLIC BLOOD PRESSURE: 123 MMHG | BODY MASS INDEX: 32.98 KG/M2 | WEIGHT: 168 LBS | HEART RATE: 108 BPM | HEIGHT: 60 IN | OXYGEN SATURATION: 98 % | DIASTOLIC BLOOD PRESSURE: 74 MMHG

## 2020-03-11 DIAGNOSIS — I48.20 ATRIAL FIBRILLATION, CHRONIC (HCC): Primary | ICD-10-CM

## 2020-03-11 PROCEDURE — 93000 ELECTROCARDIOGRAM COMPLETE: CPT | Performed by: NURSE PRACTITIONER

## 2020-03-11 NOTE — PROGRESS NOTES
"Jonah Claunch  1955  3/11/2020   ?   Chief Complaint   Patient presents with   • Nurse visit     EKG      ?   HPI:   ?   ? Patient presents for third ekg today. ANEL PANDYA to review ekg. Patient reports feeling \"good\", no complaints.  ?     Current Outpatient Medications:   •  aspirin 81 MG tablet, Take 81 mg by mouth. Takes 4 x a week, Disp: , Rfl:   •  flecainide (TAMBOCOR) 50 MG tablet, Take 1 tablet by mouth 2 (Two) Times a Day., Disp: 60 tablet, Rfl: 11  •  flecainide (TAMBOCOR) 50 MG tablet, , Disp: , Rfl:   •  metoprolol succinate XL (TOPROL XL) 100 MG 24 hr tablet, Take 1 tablet by mouth Daily., Disp: 30 tablet, Rfl: 11  •  metoprolol succinate XL (TOPROL-XL) 100 MG 24 hr tablet, Take 100 mg by mouth 2 (Two) Times a Day., Disp: , Rfl:   •  phenytoin (DILANTIN) 100 MG ER capsule, Takes 400/400/300 alternating days, Disp: , Rfl:   •  polyethylene glycol (MIRALAX) packet, Take 17 g by mouth Daily., Disp: , Rfl:    ?   ?   Doxycycline         ECG 12 Lead  Date/Time: 3/11/2020 8:17 AM  Performed by: Brandon Ochoa APRN  Authorized by: Brandon Ochoa APRN   Comparison: compared with previous ECG from 3/10/2020  Similar to previous ECG  Rate: normal  BPM: 74  QRS axis: normal  Pacing: atrial sensed rhythmComments: Atrial paced, normal axis, no acute changes, QTC day 1/4/1933, QTc day to 409, QTc day 3/4/2027             ?   Assessment/Plan    ?   ?   ?1. Atrial Fibrillation    EKG reviewed by ANEL PANDYA, went in room to speak with patient. No changes made. Patient to keep follow up, contact office sooner if any problems. Patient verbalized understanding. Caryl Jessica LPN     Patient reports feeling pretty good and is not experiencing any episodes of atrial fibrillation to his knowledge.  Patient's QTC on EKG 1 was 433, 409 on day 2, and 427 today.  Patient seems to be tolerating the flecainide well.  Patient strongly encouraged to keep his follow-up for the echocardiogram to 4 evaluation of " his ejection fraction.  We will continue the flecainide and follow patient's on routine follow-up.  Informed of signs and symptoms of ACS and advised to seek emergent treatment for any new worsening symptoms.  Patient also advised sooner follow-up as needed.  Also advised to follow-up with family doctor as needed

## 2020-03-26 ENCOUNTER — HOSPITAL ENCOUNTER (OUTPATIENT)
Dept: CARDIOLOGY | Facility: HOSPITAL | Age: 65
Discharge: HOME | End: 2020-03-26
Admitting: NURSE PRACTITIONER

## 2020-03-26 DIAGNOSIS — I48.91 ATRIAL FIBRILLATION, UNSPECIFIED TYPE (HCC): ICD-10-CM

## 2020-03-26 PROCEDURE — 93306 TTE W/DOPPLER COMPLETE: CPT | Performed by: INTERNAL MEDICINE

## 2020-03-26 PROCEDURE — 93306 TTE W/DOPPLER COMPLETE: CPT

## 2020-03-30 ENCOUNTER — TELEPHONE (OUTPATIENT)
Dept: CARDIOLOGY | Facility: CLINIC | Age: 65
End: 2020-03-30

## 2020-03-30 NOTE — TELEPHONE ENCOUNTER
"Informed patient per ANEL PANDYA he is to take Metoprolol Succinate 100 mg po daily. Patient stated for the past few weeks he has been taking twice a day. Patient stated he had been taking bid, he would run short on his 90 day supply. Instructed patient to call us when refill needed. Patient verbalized understanding. Caryl Lopez LPN    ----- Message from MUMTAZ Morgan sent at 3/30/2020 12:32 PM EDT -----  It looks like I prescribed him on metoprolol succinate which is once daily.  If he has metoprolol tartrate it would be twice daily  ----- Message -----  From: Pat Gates  Sent: 3/30/2020  11:30 AM EDT  To: Caryl Lopez LPN, MUMTAZ Morgan    Pt LVM stating he has questions about his rx doses. Stated that his bottle says Metoprolol 100mg daily, but \"i'm sure JR told me to take 100mg Bid w/ Flecanide.\"  #[phone]    Per chart review, both Metoprolol 100mg daily and Metoprolol 100mg BID are listed on pt's rx list.   OV note from 3/5/2020 states:  \" In the meantime I will increase his metoprolol to 100 mg daily.\"    JR: Please advise on Metoprolol sig, thank you!       "

## 2020-04-01 LAB
BH CV ECHO MEAS - ACS: 2.1 CM
BH CV ECHO MEAS - AO MAX PG: 4.2 MMHG
BH CV ECHO MEAS - AO MEAN PG: 2 MMHG
BH CV ECHO MEAS - AO ROOT AREA: 8.8 CM^2
BH CV ECHO MEAS - AO ROOT DIAM: 3.4 CM
BH CV ECHO MEAS - AO V2 MAX: 103 CM/SEC
BH CV ECHO MEAS - AO V2 MEAN: 74.9 CM/SEC
BH CV ECHO MEAS - AO V2 VTI: 25.3 CM
BH CV ECHO MEAS - BZI_METRIC_WEIGHT: 76.2 KG
BH CV ECHO MEAS - EDV(CUBED): 74.6 ML
BH CV ECHO MEAS - EDV(MOD-SP4): 67.5 ML
BH CV ECHO MEAS - EDV(TEICH): 79 ML
BH CV ECHO MEAS - EF(CUBED): 40.5 %
BH CV ECHO MEAS - EF(MOD-SP4): 52.4 %
BH CV ECHO MEAS - EF(TEICH): 33.8 %
BH CV ECHO MEAS - ESV(CUBED): 44.4 ML
BH CV ECHO MEAS - ESV(MOD-SP4): 32.1 ML
BH CV ECHO MEAS - ESV(TEICH): 52.3 ML
BH CV ECHO MEAS - FS: 15.9 %
BH CV ECHO MEAS - IVS/LVPW: 0.82
BH CV ECHO MEAS - IVSD: 1 CM
BH CV ECHO MEAS - LA DIMENSION: 3.2 CM
BH CV ECHO MEAS - LA/AO: 0.96
BH CV ECHO MEAS - LV IVRT: 0.1 SEC
BH CV ECHO MEAS - LV MASS(C)D: 164.9 GRAMS
BH CV ECHO MEAS - LVIDD: 4.2 CM
BH CV ECHO MEAS - LVIDS: 3.5 CM
BH CV ECHO MEAS - LVLD AP4: 7.7 CM
BH CV ECHO MEAS - LVLS AP4: 6.7 CM
BH CV ECHO MEAS - LVOT AREA (M): 3.5 CM^2
BH CV ECHO MEAS - LVOT AREA: 3.5 CM^2
BH CV ECHO MEAS - LVOT DIAM: 2.1 CM
BH CV ECHO MEAS - LVPWD: 1.3 CM
BH CV ECHO MEAS - MV A MAX VEL: 77.1 CM/SEC
BH CV ECHO MEAS - MV DEC SLOPE: 433 CM/SEC^2
BH CV ECHO MEAS - MV E MAX VEL: 110 CM/SEC
BH CV ECHO MEAS - MV E/A: 1.4
BH CV ECHO MEAS - RVDD: 2.8 CM
BH CV ECHO MEAS - SV(AO): 223 ML
BH CV ECHO MEAS - SV(CUBED): 30.3 ML
BH CV ECHO MEAS - SV(MOD-SP4): 35.4 ML
BH CV ECHO MEAS - SV(TEICH): 26.7 ML

## 2020-04-02 ENCOUNTER — TELEPHONE (OUTPATIENT)
Dept: CARDIOLOGY | Facility: CLINIC | Age: 65
End: 2020-04-02

## 2020-04-02 NOTE — TELEPHONE ENCOUNTER
Patient informed of echo results per ANEL PANDYA, reminded to keep follow up appointment. Patient verbalized understanding. Caryl Lopez LPN      ----- Message from MUMTAZ Morgan sent at 4/1/2020 12:23 PM EDT -----  No acute findings on echo.  Noted diastolic dysfunction stage II.  Keep follow-up.  ----- Message -----  From: Torsten Pretty MD  Sent: 4/1/2020  11:06 AM EDT  To: MUMTAZ Morgan

## 2020-05-27 DIAGNOSIS — I48.91 ATRIAL FIBRILLATION, UNSPECIFIED TYPE (HCC): ICD-10-CM

## 2020-05-27 DIAGNOSIS — R00.2 PALPITATIONS: ICD-10-CM

## 2020-05-27 RX ORDER — METOPROLOL SUCCINATE 100 MG/1
100 TABLET, EXTENDED RELEASE ORAL DAILY
Qty: 30 TABLET | Refills: 11 | Status: SHIPPED | OUTPATIENT
Start: 2020-05-27 | End: 2020-05-27 | Stop reason: SDUPTHER

## 2020-05-27 RX ORDER — METOPROLOL SUCCINATE 100 MG/1
100 TABLET, EXTENDED RELEASE ORAL DAILY
Qty: 90 TABLET | Refills: 3 | Status: SHIPPED | OUTPATIENT
Start: 2020-05-27 | End: 2021-08-30 | Stop reason: SDUPTHER

## 2020-06-01 ENCOUNTER — TELEPHONE (OUTPATIENT)
Dept: CARDIOLOGY | Facility: CLINIC | Age: 65
End: 2020-06-01

## 2020-06-01 DIAGNOSIS — R07.2 PRECORDIAL PAIN: ICD-10-CM

## 2020-06-01 DIAGNOSIS — I72.8 CELIAC ARTERY ANEURYSM (HCC): ICD-10-CM

## 2020-06-01 DIAGNOSIS — I72.3 ILIAC ARTERY ANEURYSM (HCC): ICD-10-CM

## 2020-06-01 DIAGNOSIS — I48.91 ATRIAL FIBRILLATION, UNSPECIFIED TYPE (HCC): Primary | ICD-10-CM

## 2020-06-01 NOTE — TELEPHONE ENCOUNTER
[facility]. CALLING NEEDING BMP FOR CTA ABD./PELVIS SCHEDULED FOR OUSMANE. V/O PER RAHEL LACY NP TO ORDER BMP. ORDER FAXED TO [facility]. PH,ANAN

## 2020-06-05 ENCOUNTER — OFFICE VISIT (OUTPATIENT)
Dept: CARDIOLOGY | Facility: CLINIC | Age: 65
End: 2020-06-05

## 2020-06-05 VITALS
SYSTOLIC BLOOD PRESSURE: 136 MMHG | OXYGEN SATURATION: 99 % | WEIGHT: 171 LBS | BODY MASS INDEX: 23.16 KG/M2 | HEART RATE: 68 BPM | DIASTOLIC BLOOD PRESSURE: 80 MMHG | HEIGHT: 72 IN

## 2020-06-05 DIAGNOSIS — I72.8 CELIAC ARTERY ANEURYSM (HCC): ICD-10-CM

## 2020-06-05 DIAGNOSIS — I48.91 ATRIAL FIBRILLATION WITH RVR (HCC): Primary | ICD-10-CM

## 2020-06-05 DIAGNOSIS — Z95.0 PRESENCE OF CARDIAC PACEMAKER: ICD-10-CM

## 2020-06-05 DIAGNOSIS — R53.83 FATIGUE, UNSPECIFIED TYPE: ICD-10-CM

## 2020-06-05 PROCEDURE — 99213 OFFICE O/P EST LOW 20 MIN: CPT | Performed by: NURSE PRACTITIONER

## 2020-06-05 NOTE — PATIENT INSTRUCTIONS
"Fat and Cholesterol Restricted Eating Plan  Getting too much fat and cholesterol in your diet may cause health problems. Choosing the right foods helps keep your fat and cholesterol at normal levels. This can keep you from getting certain diseases.  Your doctor may recommend an eating plan that includes:  · Total fat: ______% or less of total calories a day.  · Saturated fat: ______% or less of total calories a day.  · Cholesterol: less than _________mg a day.  · Fiber: ______g a day.  What are tips for following this plan?  Meal planning  · At meals, divide your plate into four equal parts:  ? Fill one-half of your plate with vegetables and green salads.  ? Fill one-fourth of your plate with whole grains.  ? Fill one-fourth of your plate with low-fat (lean) protein foods.  · Eat fish that is high in omega-3 fats at least two times a week. This includes mackerel, tuna, sardines, and salmon.  · Eat foods that are high in fiber, such as whole grains, beans, apples, broccoli, carrots, peas, and barley.  General tips    · Work with your doctor to lose weight if you need to.  · Avoid:  ? Foods with added sugar.  ? Fried foods.  ? Foods with partially hydrogenated oils.  · Limit alcohol intake to no more than 1 drink a day for nonpregnant women and 2 drinks a day for men. One drink equals 12 oz of beer, 5 oz of wine, or 1½ oz of hard liquor.  Reading food labels  · Check food labels for:  ? Trans fats.  ? Partially hydrogenated oils.  ? Saturated fat (g) in each serving.  ? Cholesterol (mg) in each serving.  ? Fiber (g) in each serving.  · Choose foods with healthy fats, such as:  ? Monounsaturated fats.  ? Polyunsaturated fats.  ? Omega-3 fats.  · Choose grain products that have whole grains. Look for the word \"whole\" as the first word in the ingredient list.  Cooking  · Cook foods using low-fat methods. These include baking, boiling, grilling, and broiling.  · Eat more home-cooked foods. Eat at restaurants and buffets " less often.  · Avoid cooking using saturated fats, such as butter, cream, palm oil, palm kernel oil, and coconut oil.  Recommended foods    Fruits  · All fresh, canned (in natural juice), or frozen fruits.  Vegetables  · Fresh or frozen vegetables (raw, steamed, roasted, or grilled). Green salads.  Grains  · Whole grains, such as whole wheat or whole grain breads, crackers, cereals, and pasta. Unsweetened oatmeal, bulgur, barley, quinoa, or brown rice. Corn or whole wheat flour tortillas.  Meats and other protein foods  · Ground beef (85% or leaner), grass-fed beef, or beef trimmed of fat. Skinless chicken or turkey. Ground chicken or turkey. Pork trimmed of fat. All fish and seafood. Egg whites. Dried beans, peas, or lentils. Unsalted nuts or seeds. Unsalted canned beans. Nut butters without added sugar or oil.  Dairy  · Low-fat or nonfat dairy products, such as skim or 1% milk, 2% or reduced-fat cheeses, low-fat and fat-free ricotta or cottage cheese, or plain low-fat and nonfat yogurt.  Fats and oils  · Tub margarine without trans fats. Light or reduced-fat mayonnaise and salad dressings. Avocado. Olive, canola, sesame, or safflower oils.  The items listed above may not be a complete list of foods and beverages you can eat. Contact a dietitian for more information.  Foods to avoid  Fruits  · Canned fruit in heavy syrup. Fruit in cream or butter sauce. Fried fruit.  Vegetables  · Vegetables cooked in cheese, cream, or butter sauce. Fried vegetables.  Grains  · White bread. White pasta. White rice. Cornbread. Bagels, pastries, and croissants. Crackers and snack foods that contain trans fat and hydrogenated oils.  Meats and other protein foods  · Fatty cuts of meat. Ribs, chicken wings, gomes, sausage, bologna, salami, chitterlings, fatback, hot dogs, bratwurst, and packaged lunch meats. Liver and organ meats. Whole eggs and egg yolks. Chicken and turkey with skin. Fried meat.  Dairy  · Whole or 2% milk, cream,  half-and-half, and cream cheese. Whole milk cheeses. Whole-fat or sweetened yogurt. Full-fat cheeses. Nondairy creamers and whipped toppings. Processed cheese, cheese spreads, and cheese curds.  Beverages  · Alcohol. Sugar-sweetened drinks such as sodas, lemonade, and fruit drinks.  Fats and oils  · Butter, stick margarine, lard, shortening, ghee, or gomes fat. Coconut, palm kernel, and palm oils.  Sweets and desserts  · Corn syrup, sugars, honey, and molasses. Candy. Jam and jelly. Syrup. Sweetened cereals. Cookies, pies, cakes, donuts, muffins, and ice cream.  The items listed above may not be a complete list of foods and beverages you should avoid. Contact a dietitian for more information.  Summary  · Choosing the right foods helps keep your fat and cholesterol at normal levels. This can keep you from getting certain diseases.  · At meals, fill one-half of your plate with vegetables and green salads.  · Eat high-fiber foods, like whole grains, beans, apples, carrots, peas, and barley.  · Limit added sugar, saturated fats, alcohol, and fried foods.  This information is not intended to replace advice given to you by your health care provider. Make sure you discuss any questions you have with your health care provider.  Document Released: 06/18/2013 Document Revised: 08/21/2019 Document Reviewed: 09/04/2018  [employer] Patient Education © 2020 [employer]  How to Quarantine at Home  Information for Patients and Families    These instructions are for people with confirmed or suspected COVID-19 who do not need to be hospitalized and those with confirmed COVID-19 who were hospitalized and discharged to care for themselves at home.    If you were tested through the Health Department  The Health Department will monitor your wellbeing.  If it is determined that you do not need to be hospitalized and can be isolated at home, you will be monitored by staff from your local or state health department.     If you were tested  through a [facility]  You will need to monitor yourself and report changes in your symptoms to your doctor.  See the section below called Monitor Your Symptoms.    Follow these steps until a healthcare provider or local or state health department says you can return to your normal activities.    Stay home except to get medical care  • Restrict activities outside your home, except for getting medical care.   • Do not go to work, school, or public areas.   • Avoid using public transportation, ride-sharing, or taxis.    Separate yourself from other people and animals in your home  People  As much as possible, you should stay in a specific room and away from other people in your home. Also, you should use a separate bathroom, if available.    Animals  You should restrict contact with pets and other animals while you are sick with COVID-19, just like you would around other people. When possible, have another member of your household care for your animals while you are sick. If you are sick with COVID-19, avoid contact with your pet, including petting, snuggling, being kissed or licked, and sharing food. If you must care for your pet or be around animals while you are sick, wash your hands before and after you interact with pets and wear a facemask. See COVID-19 and Animals for more information.    Call ahead before visiting your doctor  If you have a medical appointment, call the healthcare provider and tell them that you have or may have COVID-19. This information will help the healthcare provider’s office take steps to keep other people from getting infected or exposed.    Wear a facemask  You should wear a facemask when you are around other people (e.g., sharing a room or vehicle) or pets and before you enter a healthcare provider’s office.     If you are not able to wear a facemask (for example, because it causes trouble breathing), then people who live with you should not stay in the same room with you, or they  should wear a facemask if they enter your room.    Cover your coughs and sneezes  • Cover your mouth and nose with a tissue when you cough or sneeze.   • Throw used tissues in a lined trash can.   • Immediately wash your hands with soap and water for at least 20 seconds or, if soap and water are not available, clean your hands with an alcohol-based hand  that contains at least 60% alcohol.    Clean your hands often  • Wash your hands often with soap and water for at least 20 seconds, especially after blowing your nose, coughing, or sneezing; going to the bathroom; and before eating or preparing food.     • If soap and water are not readily available, use an alcohol-based hand  with at least 60% alcohol, covering all surfaces of your hands and rubbing them together until they feel dry.    • Soap and water are the best option if hands are visibly dirty. Avoid touching your eyes, nose, and mouth with unwashed hands.    Avoid sharing personal household items  • You should not share dishes, drinking glasses, cups, eating utensils, towels, or bedding with other people or pets in your home.   • After using these items, they should be washed thoroughly with soap and water.    Clean all “high-touch” surfaces everyday  • High touch surfaces include counters, tabletops, doorknobs, bathroom fixtures, toilets, phones, keyboards, tablets, and bedside tables.   • Also, clean any surfaces that may have blood, stool, or body fluids on them.   • Use a household cleaning spray or wipe, according to the label instructions. Labels contain instructions for safe and effective use of the cleaning product, including precautions you should take when applying the product, such as wearing gloves and making sure you have good ventilation during use of the product.    Monitor your symptoms  • Seek prompt medical attention if your illness is worsening (e.g., difficulty breathing).   • Before seeking care, call your healthcare  provider and tell them that you have, or are being evaluated for, COVID-19.   • Put on a facemask before you enter the facility.     • These steps will help the healthcare provider’s office to keep other people in the office or waiting room from getting infected or exposed.   • Persons who are placed under active monitoring or facilitated self-monitoring should follow instructions provided by their local health department or occupational health professionals, as appropriate.  • If you have a medical emergency and need to call 911, notify the dispatch personnel that you have, or are being evaluated for COVID-19. If possible, put on a facemask before emergency medical services arrive.    Discontinuing home isolation  Patients with confirmed COVID-19 should remain under home isolation precautions until the risk of secondary transmission to others is thought to be low. The decision to discontinue home isolation precautions should be made on a case-by-case basis, in consultation with healthcare providers and state and local health departments.    The below content are for household members, intimate partners, and caregivers of a patient with symptomatic laboratory-confirmed COVID-19 or a patient under investigation:    Household members, intimate partners, and caregivers may have close contact with a person with symptomatic, laboratory-confirmed COVID-19 or a person under investigation.     Close contacts should monitor their health; they should call their healthcare provider right away if they develop symptoms suggestive of COVID-19 (e.g., fever, cough, shortness of breath)     Close contacts should also follow these recommendations:  • Make sure that you understand and can help the patient follow their healthcare provider’s instructions for medication(s) and care. You should help the patient with basic needs in the home and provide support for getting groceries, prescriptions, and other personal needs.  • Monitor the  patient’s symptoms. If the patient is getting sicker, call his or her healthcare provider and tell them that the patient has laboratory-confirmed COVID-19. This will help the healthcare provider’s office take steps to keep other people in the office or waiting room from getting infected. Ask the healthcare provider to call the local or state health department for additional guidance. If the patient has a medical emergency and you need to call 911, notify the dispatch personnel that the patient has, or is being evaluated for COVID-19.  • Household members should stay in another room or be  from the patient as much as possible. Household members should use a separate bedroom and bathroom, if available.  • Prohibit visitors who do not have an essential need to be in the home.  • Household members should care for any pets in the home. Do not handle pets or other animals while sick.  For more information, see COVID-19 and Animals.  • Make sure that shared spaces in the home have good air flow, such as by an air conditioner or an opened window, weather permitting.  • Perform hand hygiene frequently. Wash your hands often with soap and water for at least 20 seconds or use an alcohol-based hand  that contains 60 to 95% alcohol, covering all surfaces of your hands and rubbing them together until they feel dry. Soap and water should be used preferentially if hands are visibly dirty.  • Avoid touching your eyes, nose, and mouth with unwashed hands.  • The patient should wear a facemask when you are around other people. If the patient is not able to wear a facemask (for example, because it causes trouble breathing), you, as the caregiver, should wear a mask when you are in the same room as the patient.  • Wear a disposable facemask and gloves when you touch or have contact with the patient’s blood, stool, or body fluids, such as saliva, sputum, nasal mucus, vomit, or urine.   o Throw out disposable facemasks  and gloves after using them. Do not reuse.  o When removing personal protective equipment, first remove and dispose of gloves. Then, immediately clean your hands with soap and water or alcohol-based hand . Next, remove and dispose of facemask, and immediately clean your hands again with soap and water or alcohol-based hand .  • Avoid sharing household items with the patient. You should not share dishes, drinking glasses, cups, eating utensils, towels, bedding, or other items. After the patient uses these items, you should wash them thoroughly (see below “Wash laundry thoroughly”).  • Clean all “high-touch” surfaces, such as counters, tabletops, doorknobs, bathroom fixtures, toilets, phones, keyboards, tablets, and bedside tables, every day. Also, clean any surfaces that may have blood, stool, or body fluids on them.   o Use a household cleaning spray or wipe, according to the label instructions. Labels contain instructions for safe and effective use of the cleaning product including precautions you should take when applying the product, such as wearing gloves and making sure you have good ventilation during use of the product.  • Wash laundry thoroughly.   o Immediately remove and wash clothes or bedding that have blood, stool, or body fluids on them.  o Wear disposable gloves while handling soiled items and keep soiled items away from your body. Clean your hands (with soap and water or an alcohol-based hand ) immediately after removing your gloves.  o Read and follow directions on labels of laundry or clothing items and detergent. In general, using a normal laundry detergent according to washing machine instructions and dry thoroughly using the warmest temperatures recommended on the clothing label.  • Place all used disposable gloves, facemasks, and other contaminated items in a lined container before disposing of them with other household waste. Clean your hands (with soap and water or  an alcohol-based hand ) immediately after handling these items. Soap and water should be used preferentially if hands are visibly dirty.  • Discuss any additional questions with your state or local health department or healthcare provider.    Adapted from information provided by the Centers for Disease Control and Prevention.  For more information, visit https://www.cdc.gov/coronavirus/2019-ncov/hcp/guidance-prevent-spread.html  Acute Coronary Syndrome  Acute coronary syndrome (ACS) is a serious problem in which there is suddenly not enough blood and oxygen reaching the heart. ACS can result in chest pain or a heart attack.  This condition is a medical emergency. If you have any symptoms of this condition, get help right away.  What are the causes?  This condition may be caused by:  · A buildup of fat and cholesterol inside the arteries (atherosclerosis). This is the most common cause. The buildup (plaque) can cause blood vessels in the heart (coronary arteries) to become narrow or blocked, which reduces blood flow to the heart. Plaque can also break off and lead to a clot, which can block an artery and cause a heart attack or stroke.  · Sudden tightening of the muscles around the coronary arteries (coronary spasm).  · Tearing of a coronary artery (spontaneous coronary artery dissection).  · Very low blood pressure (hypotension).  · An abnormal heartbeat (arrhythmia).  · Other medical conditions that cause a decrease of oxygen to the heart, such as anemiaorrespiratory failure.  · Using cocaine or methamphetamine.  What increases the risk?  The following factors may make you more likely to develop this condition:  · Age. The risk for ACS increases as you get older.  · History of chest pain, heart attack, peripheral artery disease, or stroke.  · Having taken chemotherapy or immune-suppressing medicines.  · Being male.  · Family history of chest pain, heart disease, or stroke.  · Smoking.  · Not exercising  enough.  · Being overweight.  · High cholesterol.  · High blood pressure (hypertension).  · Diabetes.  · Excessive alcohol use.  What are the signs or symptoms?  Common symptoms of this condition include:  · Chest pain. The pain may last a long time, or it may stop and come back (recur). It may feel like:  ? Crushing or squeezing.  ? Tightness, pressure, fullness, or heaviness.  · Arm, neck, jaw, or back pain.  · Heartburn or indigestion.  · Shortness of breath.  · Nausea.  · Sudden cold sweats.  · Light-headedness.  · Dizziness or passing out.  · Tiredness (fatigue).  Sometimes there are no symptoms.  How is this diagnosed?  This condition may be diagnosed based on:  · Your medical history and symptoms.  · Imaging tests, such as:  ? An electrocardiogram (ECG). This measures the heart's electrical activity.  ? X-rays.  ? CT scan.  ? A coronary angiogram. For this test, dye is injected into the heart arteries and then X-rays are taken.  ? Myocardial perfusion imaging. This test shows how well blood flows through your heart muscle.  · Blood tests. These may be repeated at certain time intervals.  · Exercise stress testing.  · Echocardiogram. This is a test that uses sound waves to produce detailed images of the heart.  How is this treated?  Treatment for this condition may include:  · Oxygen therapy.  · Medicines, such as:  ? Antiplatelet medicines and blood-thinning medicines, such as aspirin. These help prevent blood clots.  ? Medicine that dissolves any blood clots (fibrinolytic therapy).  ? Blood pressure medicines.  ? Nitroglycerin. This helps widen blood vessels to improve blood flow.  ? Pain medicine.  ? Cholesterol-lowering medicine.  · Surgery, such as:  ? Coronary angioplasty with stent placement. This involves placing a small piece of metal that looks like mesh or a spring into a narrow coronary artery. This widens the artery and keeps it open.  ? Coronary artery bypass surgery. This involves taking a  section of a blood vessel from a different part of your body and placing it on the blocked coronary artery to allow blood to flow around the blockage.  · Cardiac rehabilitation. This is a program that includes exercise training, education, and counseling to help you recover.  Follow these instructions at home:  Eating and drinking  · Eat a heart-healthy diet that includes whole grains, fruits and vegetables, lean proteins, and low-fat or nonfat dairy products.  · Limit how much salt (sodium) you eat as told by your health care provider. Follow instructions from your health care provider about any other eating or drinking restrictions, such as limiting foods that are high in fat and processed sugars.  · Use healthy cooking methods such as roasting, grilling, broiling, baking, poaching, steaming, or stir-frying.  · Work with a dietitian to follow a heart-healthy eating plan.  Medicines  · Take over-the-counter and prescription medicines only as told by your health care provider.  · Do not take these medicines unless your health care provider approves:  ? Vitamin supplements that contain vitamin A or vitamin E.  ? NSAIDs, such as ibuprofen, naproxen, or celecoxib.  ? Hormone replacement therapy that contains estrogen.  · If you are taking blood thinners:  ? Talk with your health care provider before you take any medicines that contain aspirin or NSAIDs. These medicines increase your risk for dangerous bleeding.  ? Take your medicine exactly as told, at the same time every day.  ? Avoid activities that could cause injury or bruising, and follow instructions about how to prevent falls.  ? Wear a medical alert bracelet, and carry a card that lists what medicines you take.  Activity  · Follow your cardiac rehabilitation program. Do exercises as told by your physical therapist.  · Ask your health care provider what activities and exercises are safe for you. Follow his or her instructions about lifting, driving, or climbing  stairs.  Lifestyle  · Do not use any products that contain nicotine or tobacco, such as cigarettes, e-cigarettes, and chewing tobacco. If you need help quitting, ask your health care provider.  · Do not drink alcohol if your health care provider tells you not to drink.  · If you drink alcohol:  ? Limit how much you have to 0-1 drink a day.  ? Be aware of how much alcohol is in your drink. In the [place] one drink equals one 12 oz bottle of beer (355 mL), one 5 oz glass of wine (148 mL), or one 1½ oz glass of hard liquor (44 mL).  · Maintain a healthy weight. If you need to lose weight, work with your health care provider to do so safely.  General instructions  · Tell all the health care providers who provide care for you about your heart condition, including your dentist. This may affect the medicines or treatment you receive.  · Manage any other health conditions you have, such as hypertension or diabetes. These conditions affect your heart.  · Pay attention to your mental health. You may be at higher risk for depression.  ? Find ways to manage stress.  ? Talk to your health care provider about depression screening and treatment.  · Keep your vaccinations up to date.  ? Get the flu shot (influenza vaccine) every year.  ? Get the pneumococcal vaccine if you are age 65 or older.  · If directed, monitor your blood pressure at home.  · Keep all follow-up visits as told by your health care provider. This is important.  Contact a health care provider if you:  · Feel overwhelmed or sad.  · Have trouble doing your daily activities.  Get help right away if you:  · Have pain in your chest, neck, arm, jaw, stomach, or back that recurs, and:  ? It lasts for more than a few minutes.  ? It is not relieved by taking the medicineyour health care provider prescribed.  · Have unexplained:  ? Heavy sweating.  ? Heartburn or indigestion.  ? Nausea or vomiting.  ? Shortness of breath.  ? Difficulty breathing.  ? Fatigue.  ? Nervousness  or anxiety.  ? Weakness.  ? Diarrhea.  ? Dark stools or blood in your stool.  · Have sudden light-headedness or dizziness.  · Have blood pressure that is higher than 180/120.  · Faint.  · Have thoughts about hurting yourself.  These symptoms may represent a serious problem that is an emergency. Do not wait to see if the symptoms will go away. Get medical help right away. Call your local emergency services (911 in the [place] Do not drive yourself to the hospital.   Summary  · Acute coronary syndrome (ACS) is when there is not enough blood and oxygen being supplied to the heart. ACS can result in chest pain or a heart attack.  · Acute coronary syndrome is a medical emergency. If you have any symptoms of this condition, get help right away.  · Treatment includes medicines and procedures to open the blocked arteries and restore blood flow.  This information is not intended to replace advice given to you by your health care provider. Make sure you discuss any questions you have with your health care provider.  Document Released: 12/18/2006 Document Revised: 12/30/2019 Document Reviewed: 12/30/2019  [email or web address] Patient Education © 2020 [employer]

## 2020-06-05 NOTE — PROGRESS NOTES
Loli Nuñez is a 64 y.o. male who presents to day for Atrial Fibrillation.    CHIEF COMPLIANT  Chief Complaint   Patient presents with   • Atrial Fibrillation       Active Problems:  Problem List Items Addressed This Visit        Cardiovascular and Mediastinum    Atrial fibrillation with RVR (CMS/HCC) - Primary    Celiac artery aneurysm (CMS/HCC)      Other Visit Diagnoses     Fatigue, unspecified type        Presence of cardiac pacemaker              HPI  HPI  Mr. Gaines is a 64-year-old male patient who is being followed up today for atrial fibrillation status post pacemaker placement in January 2020 for significant bradycardia.  Patient states that if he has been having any episodes of atrial fibrillation that he has not been able to fill it.  Overall patient states that he is being doing well and he denies any angina or anginal equivalent symptoms.  Patient does report fatigue in which he gets tired easily.  He denies any chest pain, shortness of breath, palpitations, PND, orthopnea, syncope, lower extremity edema, or other neurological changes.  PRIOR MEDS  Current Outpatient Medications on File Prior to Visit   Medication Sig Dispense Refill   • aspirin 81 MG tablet Take 81 mg by mouth.     • flecainide (TAMBOCOR) 50 MG tablet Take 1 tablet by mouth 2 (Two) Times a Day. 60 tablet 11   • metoprolol succinate XL (Toprol XL) 100 MG 24 hr tablet Take 1 tablet by mouth Daily. 90 tablet 3   • phenytoin (DILANTIN) 100 MG ER capsule Takes 400/400/300 alternating days     • polyethylene glycol (MIRALAX) packet Take 17 g by mouth Daily.       No current facility-administered medications on file prior to visit.        ALLERGIES  Doxycycline    HISTORY  Past Medical History:   Diagnosis Date   • Aneurysm (CMS/HCC)    • Atrial fibrillation (CMS/HCC)    • Hyperlipidemia    • Iliac artery aneurysm (CMS/HCC)        Social History     Socioeconomic History   • Marital status:      Spouse name: Not on file    "  • Number of children: Not on file   • Years of education: Not on file   • Highest education level: Not on file   Tobacco Use   • Smoking status: Current Some Day Smoker     Types: Cigarettes   • Smokeless tobacco: Former User   • Tobacco comment: smokes approx. < 1/2 PPD for approx. 30 yrs.   Substance and Sexual Activity   • Alcohol use: Yes     Comment: occas. beer   • Drug use: Never       Family History   Problem Relation Age of Onset   • Lung cancer Mother    • Osteoporosis Mother    • Heart attack Father    • Coronary artery disease Father        Review of Systems   Constitutional: Positive for fatigue. Negative for chills and fever.   HENT: Negative.  Negative for congestion, sinus pressure and sore throat.    Eyes: Positive for visual disturbance (glasses).   Respiratory: Negative.  Negative for chest tightness and shortness of breath.    Cardiovascular: Negative.  Negative for chest pain, palpitations and leg swelling.   Gastrointestinal: Negative.  Negative for abdominal pain, blood in stool, constipation, diarrhea, nausea and vomiting.   Endocrine: Negative.  Negative for cold intolerance and heat intolerance.   Genitourinary: Negative.  Negative for dysuria, frequency, hematuria and urgency.   Musculoskeletal: Positive for arthralgias. Negative for back pain and neck pain.   Skin: Negative.  Negative for rash and wound.   Allergic/Immunologic: Negative.  Negative for environmental allergies and food allergies.   Neurological: Negative.  Negative for dizziness, syncope and light-headedness.   Hematological: Bruises/bleeds easily.   Psychiatric/Behavioral: Negative.  Negative for sleep disturbance (denies waking with soa or cp, denies sleeping elevated).       Objective     VITALS: /80 (BP Location: Right arm, Patient Position: Sitting)   Pulse 68   Ht 182.9 cm (72\")   Wt 77.6 kg (171 lb)   SpO2 99%   BMI 23.19 kg/m²     LABS:   Lab Results (most recent)     None          IMAGING:   No Images " in the past 120 days found..    EXAM:  Physical Exam   Constitutional: He is oriented to person, place, and time. He appears well-developed and well-nourished.   HENT:   Head: Normocephalic and atraumatic.   Eyes: Pupils are equal, round, and reactive to light. EOM are normal.   Neck: Neck supple. No JVD present. Carotid bruit is not present.   Cardiovascular: Normal rate, regular rhythm, normal heart sounds and intact distal pulses. Exam reveals no gallop.   No murmur heard.  Pulses:       Carotid pulses are 2+ on the right side, and 2+ on the left side.       Radial pulses are 2+ on the right side, and 2+ on the left side.        Posterior tibial pulses are 2+ on the right side, and 2+ on the left side.   Pulmonary/Chest: Effort normal and breath sounds normal. No respiratory distress.   Abdominal: Soft. Bowel sounds are normal. He exhibits no distension. There is no tenderness.   Musculoskeletal: Normal range of motion. He exhibits no edema.   Neurological: He is alert and oriented to person, place, and time. He has normal strength. No cranial nerve deficit or sensory deficit.   Skin: Skin is warm, dry and intact.   Psychiatric: He has a normal mood and affect. His speech is normal and behavior is normal. Judgment and thought content normal. Cognition and memory are normal.   Nursing note and vitals reviewed.      Procedure   Procedures       Assessment/Plan    Diagnosis Plan   1. Atrial fibrillation with RVR (CMS/HCC)     2. Celiac artery aneurysm (CMS/HCC)     3. Fatigue, unspecified type     4. Presence of cardiac pacemaker     1.  Patient does have a history of atrial fibrillation as well as sinus pauses.  He did have a pacemaker placed and January 2020.  Patient verbalized that he is doing well and he has not noticed any episodes of atrial fibrillation at all.  We will continue his current medication regimen.  Patient is on metoprolol, flecainide, and aspirin.  2.  Patient also has a known celiac artery  aneurysm.  However it has not changed in size and will continue to monitor it.   3.  Patient's fatigue is been manageable and no further work-up is needed at this time.  4.  Patient will have his pacemaker checked every 6 months.  5.  Informed of signs and symptoms of ACS and advised to seek emergent treatment for any new worsening symptoms.  Patient also advised sooner follow-up as needed.  Also advised to follow-up with family doctor as needed  This note is dictated utilizing voice recognition software.  Although this record has been proof read, transcriptional errors may still be present. If questions occur regarding the content of this record please do not hesitate to call our office.    Return in about 6 months (around 12/5/2020), or if symptoms worsen or fail to improve.    Jonah was seen today for atrial fibrillation.    Diagnoses and all orders for this visit:    Atrial fibrillation with RVR (CMS/HCC)    Celiac artery aneurysm (CMS/HCC)    Fatigue, unspecified type    Presence of cardiac pacemaker    Other orders  -     SCANNED - IMAGING        Joanh Nuñez  reports that he has been smoking cigarettes. He has quit using smokeless tobacco.. I have educated him on the risk of diseases from using tobacco products such as cancer, COPD and heart diease.     I advised him to quit and he is not willing to quit.    I spent 3  minutes counseling the patient.           Patient's Body mass index is 23.19 kg/m². BMI is within normal parameters. No follow-up required..           MEDS ORDERED DURING VISIT:  No orders of the defined types were placed in this encounter.          This document has been electronically signed by Brandon Ochoa Jr., APRN  Vesna 15, 2020 00:49

## 2020-09-25 ENCOUNTER — OFFICE VISIT (OUTPATIENT)
Dept: CARDIOLOGY | Facility: CLINIC | Age: 65
End: 2020-09-25

## 2020-09-25 VITALS
WEIGHT: 174 LBS | DIASTOLIC BLOOD PRESSURE: 72 MMHG | RESPIRATION RATE: 16 BRPM | OXYGEN SATURATION: 97 % | HEIGHT: 72 IN | BODY MASS INDEX: 23.57 KG/M2 | HEART RATE: 86 BPM | TEMPERATURE: 96.8 F | SYSTOLIC BLOOD PRESSURE: 105 MMHG

## 2020-09-25 DIAGNOSIS — R00.2 PALPITATIONS: ICD-10-CM

## 2020-09-25 DIAGNOSIS — Z95.0 PRESENCE OF CARDIAC PACEMAKER: ICD-10-CM

## 2020-09-25 DIAGNOSIS — I48.91 ATRIAL FIBRILLATION WITH RVR (HCC): Primary | ICD-10-CM

## 2020-09-25 DIAGNOSIS — I72.8 CELIAC ARTERY ANEURYSM (HCC): ICD-10-CM

## 2020-09-25 DIAGNOSIS — I48.91 ATRIAL FIBRILLATION WITH RVR (HCC): ICD-10-CM

## 2020-09-25 PROCEDURE — 93000 ELECTROCARDIOGRAM COMPLETE: CPT | Performed by: NURSE PRACTITIONER

## 2020-09-25 PROCEDURE — 99214 OFFICE O/P EST MOD 30 MIN: CPT | Performed by: NURSE PRACTITIONER

## 2020-09-25 PROCEDURE — 93288 INTERROG EVL PM/LDLS PM IP: CPT | Performed by: INTERNAL MEDICINE

## 2020-09-25 RX ORDER — FLECAINIDE ACETATE 100 MG/1
100 TABLET ORAL 2 TIMES DAILY
Qty: 60 TABLET | Refills: 11 | Status: SHIPPED | OUTPATIENT
Start: 2020-09-25 | End: 2021-09-16 | Stop reason: SDUPTHER

## 2020-09-28 ENCOUNTER — CLINICAL SUPPORT (OUTPATIENT)
Dept: CARDIOLOGY | Facility: CLINIC | Age: 65
End: 2020-09-28

## 2020-09-28 VITALS
WEIGHT: 174.6 LBS | SYSTOLIC BLOOD PRESSURE: 126 MMHG | DIASTOLIC BLOOD PRESSURE: 75 MMHG | HEIGHT: 72 IN | OXYGEN SATURATION: 100 % | BODY MASS INDEX: 23.65 KG/M2 | HEART RATE: 60 BPM

## 2020-09-28 DIAGNOSIS — I48.91 ATRIAL FIBRILLATION WITH RVR (HCC): Primary | ICD-10-CM

## 2020-09-28 DIAGNOSIS — I45.5 SINUS PAUSE: ICD-10-CM

## 2020-09-28 DIAGNOSIS — R00.2 PALPITATIONS: ICD-10-CM

## 2020-09-28 PROCEDURE — 93000 ELECTROCARDIOGRAM COMPLETE: CPT | Performed by: NURSE PRACTITIONER

## 2020-09-29 ENCOUNTER — CLINICAL SUPPORT (OUTPATIENT)
Dept: CARDIOLOGY | Facility: CLINIC | Age: 65
End: 2020-09-29

## 2020-09-29 VITALS
OXYGEN SATURATION: 95 % | BODY MASS INDEX: 23.84 KG/M2 | SYSTOLIC BLOOD PRESSURE: 117 MMHG | HEIGHT: 72 IN | WEIGHT: 176 LBS | HEART RATE: 65 BPM | TEMPERATURE: 96.9 F | DIASTOLIC BLOOD PRESSURE: 69 MMHG

## 2020-09-29 DIAGNOSIS — I48.91 ATRIAL FIBRILLATION WITH RVR (HCC): Primary | ICD-10-CM

## 2020-09-29 PROCEDURE — 93000 ELECTROCARDIOGRAM COMPLETE: CPT | Performed by: NURSE PRACTITIONER

## 2020-09-30 ENCOUNTER — CLINICAL SUPPORT (OUTPATIENT)
Dept: CARDIOLOGY | Facility: CLINIC | Age: 65
End: 2020-09-30

## 2020-09-30 VITALS
TEMPERATURE: 96.9 F | OXYGEN SATURATION: 98 % | HEART RATE: 64 BPM | SYSTOLIC BLOOD PRESSURE: 112 MMHG | WEIGHT: 176 LBS | RESPIRATION RATE: 16 BRPM | DIASTOLIC BLOOD PRESSURE: 69 MMHG | BODY MASS INDEX: 23.84 KG/M2 | HEIGHT: 72 IN

## 2020-09-30 DIAGNOSIS — I48.91 ATRIAL FIBRILLATION WITH RVR (HCC): Primary | ICD-10-CM

## 2020-09-30 PROCEDURE — 93000 ELECTROCARDIOGRAM COMPLETE: CPT | Performed by: NURSE PRACTITIONER

## 2021-03-26 ENCOUNTER — OFFICE VISIT (OUTPATIENT)
Dept: CARDIOLOGY | Facility: CLINIC | Age: 66
End: 2021-03-26

## 2021-03-26 DIAGNOSIS — I48.91 ATRIAL FIBRILLATION WITH RVR (HCC): ICD-10-CM

## 2021-03-26 DIAGNOSIS — R00.2 PALPITATIONS: ICD-10-CM

## 2021-03-26 PROCEDURE — 93288 INTERROG EVL PM/LDLS PM IP: CPT | Performed by: INTERNAL MEDICINE

## 2021-04-12 ENCOUNTER — OFFICE VISIT (OUTPATIENT)
Dept: CARDIOLOGY | Facility: CLINIC | Age: 66
End: 2021-04-12

## 2021-04-12 VITALS
HEIGHT: 72 IN | WEIGHT: 177.6 LBS | DIASTOLIC BLOOD PRESSURE: 76 MMHG | BODY MASS INDEX: 24.06 KG/M2 | OXYGEN SATURATION: 99 % | SYSTOLIC BLOOD PRESSURE: 128 MMHG | HEART RATE: 80 BPM

## 2021-04-12 DIAGNOSIS — Z90.81 HX OF SPLENECTOMY: ICD-10-CM

## 2021-04-12 DIAGNOSIS — I72.8 CELIAC ARTERY ANEURYSM (HCC): Primary | ICD-10-CM

## 2021-04-12 DIAGNOSIS — I48.91 ATRIAL FIBRILLATION, UNSPECIFIED TYPE (HCC): ICD-10-CM

## 2021-04-12 PROCEDURE — 99214 OFFICE O/P EST MOD 30 MIN: CPT | Performed by: PHYSICIAN ASSISTANT

## 2021-04-12 NOTE — PATIENT INSTRUCTIONS

## 2021-04-12 NOTE — PROGRESS NOTES
Problem list     Subjective   Jonah Nuñez is a 65 y.o. male     Chief Complaint   Patient presents with   • Follow-up   Problem list  1.  Paroxysmal atrial fibrillation currently on flecainide therapy  2.  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  Preserved systolic function  4.  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  Recently, he had his flecainide increased to help with suppression of atrial fibrillation.  Patient is doing well.  He has no chest pain or pressure.  He has no shortness of breath.  No PND orthopnea.    He does not complain of palpitating but on occasion.  No dizziness presyncope or syncope.  No symptoms of cerebral embolic events.  Otherwise is doing well      Current Outpatient Medications on File Prior to Visit   Medication Sig Dispense Refill   • aspirin 81 MG tablet Take 81 mg by mouth.     • flecainide (TAMBOCOR) 100 MG tablet Take 1 tablet by mouth 2 (Two) Times a Day. 60 tablet 11   • metoprolol succinate XL (Toprol XL) 100 MG 24 hr tablet Take 1 tablet by mouth Daily. 90 tablet 3   • phenytoin (DILANTIN) 100 MG ER capsule 3 (Three) Times a Day.     • polyethylene glycol (MIRALAX) packet Take 17 g by mouth Daily.       No current facility-administered medications on file prior to visit.       Doxycycline    Past Medical History:   Diagnosis Date   • Aneurysm (CMS/HCC)    • Atrial fibrillation (CMS/HCC)    • Hyperlipidemia    • Iliac artery aneurysm (CMS/HCC)        Social History     Socioeconomic History   • Marital status:      Spouse name: Not on file   • Number of children: Not on file   • Years of education: Not on file   • Highest education level: Not on file   Tobacco Use   • Smoking status: Current Some Day Smoker     Packs/day: 0.25     Types: Cigarettes   • Smokeless tobacco: Former User   • Tobacco comment: smokes approx.  "< 1/2 PPD for approx. 30 yrs.   Substance and Sexual Activity   • Alcohol use: Yes     Comment: occas. beer   • Drug use: Never   • Sexual activity: Defer       Family History   Problem Relation Age of Onset   • Lung cancer Mother    • Osteoporosis Mother    • Heart attack Father    • Coronary artery disease Father        Review of Systems   Constitutional: Negative.  Negative for chills, fatigue and fever.   HENT: Positive for congestion and rhinorrhea. Negative for sore throat.    Eyes: Positive for visual disturbance (glasses).   Respiratory: Negative for apnea, chest tightness and shortness of breath.    Cardiovascular: Positive for palpitations (occas- afib). Negative for chest pain and leg swelling.   Gastrointestinal: Negative.    Endocrine: Negative.    Genitourinary: Negative.    Musculoskeletal: Negative.  Negative for back pain, gait problem, neck pain and neck stiffness.   Skin: Negative.  Negative for rash and wound.   Allergic/Immunologic: Positive for environmental allergies (seasonal allergies). Negative for food allergies.   Neurological: Negative.  Negative for dizziness, weakness, light-headedness, numbness and headaches.   Hematological: Bruises/bleeds easily.   Psychiatric/Behavioral: Negative.  Negative for sleep disturbance.       Objective   Vitals:    04/12/21 1401   BP: 128/76   BP Location: Left arm   Patient Position: Sitting   Pulse: 80   SpO2: 99%   Weight: 80.6 kg (177 lb 9.6 oz)   Height: 182.9 cm (72.01\")      /76 (BP Location: Left arm, Patient Position: Sitting)   Pulse 80   Ht 182.9 cm (72.01\")   Wt 80.6 kg (177 lb 9.6 oz)   SpO2 99%   BMI 24.08 kg/m²     Lab Results (most recent)     None          Physical Exam  Vitals and nursing note reviewed.   Constitutional:       General: He is not in acute distress.     Appearance: Normal appearance. He is well-developed.   HENT:      Head: Normocephalic and atraumatic.   Eyes:      General: No scleral icterus.        Right eye: " No discharge.         Left eye: No discharge.      Conjunctiva/sclera: Conjunctivae normal.   Neck:      Vascular: No carotid bruit.   Cardiovascular:      Rate and Rhythm: Normal rate and regular rhythm.      Heart sounds: Normal heart sounds. No murmur heard.   No friction rub. No gallop.    Pulmonary:      Effort: Pulmonary effort is normal. No respiratory distress.      Breath sounds: Normal breath sounds. No wheezing or rales.   Chest:      Chest wall: No tenderness.   Musculoskeletal:      Right lower leg: No edema.      Left lower leg: No edema.   Skin:     General: Skin is warm and dry.      Coloration: Skin is not pale.      Findings: No erythema or rash.   Neurological:      Mental Status: He is alert and oriented to person, place, and time.      Cranial Nerves: No cranial nerve deficit.   Psychiatric:         Behavior: Behavior normal.         Procedure   Procedures       Assessment/Plan     Problems Addressed this Visit        Cardiac and Vasculature    Atrial fibrillation (CMS/HCC)    Relevant Orders    CT angiogram abdomen pelvis w wo contrast    Celiac artery aneurysm (CMS/HCC) - Primary    Relevant Orders    CT angiogram abdomen pelvis w wo contrast       Gastrointestinal Abdominal     Hx of splenectomy    Relevant Orders    CT angiogram abdomen pelvis w wo contrast      Diagnoses       Codes Comments    Celiac artery aneurysm (CMS/HCC)    -  Primary ICD-10-CM: I72.8  ICD-9-CM: 442.84     Atrial fibrillation, unspecified type (CMS/HCC)     ICD-10-CM: I48.91  ICD-9-CM: 427.31     Hx of splenectomy     ICD-10-CM: Z90.81  ICD-9-CM: V45.79           Recommendation  1.  Patient is a 65-year-old male with history of paroxysmal atrial fibrillation doing well on increased dose of flecainide therapy for now we will continue.  Chads 2 vascular score of 1 currently doing well on aspirin therapy with no symptoms of cerebral embolic events  2.  Patient with history of splenectomy and celiac artery aneurysm.  He  would like evaluation and will schedule CTA of the abdomen    3.  Otherwise we will see him back for follow-up on testing.  Follow-up with primary as scheduled           Jonah Nuñez  reports that he has been smoking cigarettes. He has been smoking about 0.25 packs per day. He has quit using smokeless tobacco.. I have educated him on the risk of diseases from using tobacco products such as cancer, COPD and heart disease.     I advised him to quit and he is not willing to quit.    I spent 3  minutes counseling the patient.          Patient's Body mass index is 24.08 kg/m². BMI is within normal parameters. No follow-up required..       Advance Care Planning   ACP discussion was held with the patient during this visit. Patient does not have an advance directive, declines further assistance.    Electronically signed by:

## 2021-04-13 ENCOUNTER — TELEPHONE (OUTPATIENT)
Dept: CARDIOLOGY | Facility: CLINIC | Age: 66
End: 2021-04-13

## 2021-04-13 DIAGNOSIS — I48.20 CHRONIC ATRIAL FIBRILLATION (HCC): Primary | ICD-10-CM

## 2021-04-15 ENCOUNTER — TELEPHONE (OUTPATIENT)
Dept: CARDIOLOGY | Facility: CLINIC | Age: 66
End: 2021-04-15

## 2021-04-15 NOTE — TELEPHONE ENCOUNTER
Spoke w/ patient regarding results. Verbalized understanding and has no further questions at this time. ISAK, JAMISON.

## 2021-04-15 NOTE — TELEPHONE ENCOUNTER
----- Message from KATIA Sutton sent at 4/15/2021 10:31 AM EDT -----  We will let patient know the results.  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

## 2021-08-30 DIAGNOSIS — R00.2 PALPITATIONS: ICD-10-CM

## 2021-08-30 DIAGNOSIS — I48.91 ATRIAL FIBRILLATION, UNSPECIFIED TYPE (HCC): ICD-10-CM

## 2021-08-30 RX ORDER — METOPROLOL SUCCINATE 100 MG/1
100 TABLET, EXTENDED RELEASE ORAL DAILY
Qty: 90 TABLET | Refills: 0 | Status: SHIPPED | OUTPATIENT
Start: 2021-08-30 | End: 2021-09-16 | Stop reason: SDUPTHER

## 2021-09-16 ENCOUNTER — OFFICE VISIT (OUTPATIENT)
Dept: CARDIOLOGY | Facility: CLINIC | Age: 66
End: 2021-09-16

## 2021-09-16 VITALS
DIASTOLIC BLOOD PRESSURE: 86 MMHG | OXYGEN SATURATION: 98 % | SYSTOLIC BLOOD PRESSURE: 134 MMHG | WEIGHT: 177.2 LBS | HEART RATE: 67 BPM | HEIGHT: 72 IN | BODY MASS INDEX: 24 KG/M2

## 2021-09-16 DIAGNOSIS — I48.91 ATRIAL FIBRILLATION, UNSPECIFIED TYPE (HCC): ICD-10-CM

## 2021-09-16 DIAGNOSIS — R00.2 PALPITATIONS: ICD-10-CM

## 2021-09-16 DIAGNOSIS — I48.91 ATRIAL FIBRILLATION WITH RVR (HCC): ICD-10-CM

## 2021-09-16 PROCEDURE — 93000 ELECTROCARDIOGRAM COMPLETE: CPT | Performed by: NURSE PRACTITIONER

## 2021-09-16 PROCEDURE — 99214 OFFICE O/P EST MOD 30 MIN: CPT | Performed by: NURSE PRACTITIONER

## 2021-09-16 RX ORDER — METOPROLOL SUCCINATE 100 MG/1
100 TABLET, EXTENDED RELEASE ORAL DAILY
Qty: 90 TABLET | Refills: 3 | Status: SHIPPED | OUTPATIENT
Start: 2021-09-16 | End: 2022-03-25 | Stop reason: SDUPTHER

## 2021-09-16 RX ORDER — FLECAINIDE ACETATE 100 MG/1
100 TABLET ORAL 2 TIMES DAILY
Qty: 180 TABLET | Refills: 3 | Status: SHIPPED | OUTPATIENT
Start: 2021-09-16 | End: 2022-03-25 | Stop reason: SDUPTHER

## 2021-09-16 NOTE — PROGRESS NOTES
Loli Nuñez is a 66 y.o. male who presents to day for Atrial Fibrillation.    CHIEF COMPLIANT  Chief Complaint   Patient presents with   • Atrial Fibrillation       Active Problems:  Problem List Items Addressed This Visit        Cardiac and Vasculature    Atrial fibrillation (CMS/HCC)    Relevant Medications    flecainide (TAMBOCOR) 100 MG tablet    metoprolol succinate XL (Toprol XL) 100 MG 24 hr tablet    Palpitations    Relevant Medications    metoprolol succinate XL (Toprol XL) 100 MG 24 hr tablet      Other Visit Diagnoses     Atrial fibrillation with RVR (CMS/HCC)        Relevant Medications    flecainide (TAMBOCOR) 100 MG tablet    metoprolol succinate XL (Toprol XL) 100 MG 24 hr tablet      Problem list  1.  Paroxysmal atrial fibrillation currently on flecainide therapy  2.  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  Preserved systolic function  4.  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 Cleary is a 65-year-old male patient who is being followed up today for atrial fibrillation with sick sinus syndrome.  He did have a pacemaker placed and is having 6-month routine checks.  He still continues to have breakthrough atrial fibrillation of about 8% on his last interrogation.  He reports that the majority of his atrial fibrillation seems to be in the morning hours between 5 and 6 which is supported by the pacemaker interrogation.  He says that he can definitely tell when he goes into atrial fibrillation but overall he feels he is done relatively well and does not have a during the waking hours.  He is currently anticoagulated with aspirin and he is on flecainide 100 mg twice daily as well as metoprolol 100 mg daily to help control his atrial fibrillation.  He does report fatigue where he gets tired easily.  Overall he is doing well and denies any angina anginal equivalent symptoms.  He denies any chest pain,  lower extremity edema, palpitations, dizziness, lightheadedness, PND, orthopnea, syncope, or strokelike symptoms.  PRIOR MEDS  Current Outpatient Medications on File Prior to Visit   Medication Sig Dispense Refill   • aspirin 81 MG tablet Take 81 mg by mouth.     • phenytoin (DILANTIN) 100 MG ER capsule 3 (Three) Times a Day.     • polyethylene glycol (MIRALAX) packet Take 17 g by mouth Daily.     • [DISCONTINUED] flecainide (TAMBOCOR) 100 MG tablet Take 1 tablet by mouth 2 (Two) Times a Day. 60 tablet 11   • [DISCONTINUED] metoprolol succinate XL (Toprol XL) 100 MG 24 hr tablet Take 1 tablet by mouth Daily. 90 tablet 0     No current facility-administered medications on file prior to visit.       ALLERGIES  Doxycycline    HISTORY  Past Medical History:   Diagnosis Date   • Aneurysm (CMS/HCC)    • Atrial fibrillation (CMS/HCC)    • Hyperlipidemia    • Iliac artery aneurysm (CMS/HCC)        Social History     Socioeconomic History   • Marital status:      Spouse name: Not on file   • Number of children: Not on file   • Years of education: Not on file   • Highest education level: Not on file   Tobacco Use   • Smoking status: Current Some Day Smoker     Packs/day: 0.25     Types: Cigarettes   • Smokeless tobacco: Former User   • Tobacco comment: smokes approx. < 1/2 PPD for approx. 30 yrs.   Substance and Sexual Activity   • Alcohol use: Yes     Comment: occas. beer   • Drug use: Never   • Sexual activity: Defer       Family History   Problem Relation Age of Onset   • Lung cancer Mother    • Osteoporosis Mother    • Heart attack Father    • Coronary artery disease Father        Review of Systems   Constitutional: Positive for fatigue. Negative for chills, diaphoresis and fever.   HENT: Negative.    Respiratory: Positive for cough (allergies). Negative for apnea, chest tightness, shortness of breath and wheezing.    Cardiovascular: Negative.  Negative for chest pain, palpitations and leg swelling.  "  Gastrointestinal: Negative.  Negative for blood in stool.   Endocrine: Negative.    Genitourinary: Negative.  Negative for hematuria.   Musculoskeletal: Negative.    Skin: Negative.    Allergic/Immunologic: Negative.    Neurological: Negative.  Negative for dizziness, syncope, weakness, light-headedness, numbness and headaches.   Hematological: Bruises/bleeds easily.   Psychiatric/Behavioral: Negative.  Negative for sleep disturbance.       Objective     VITALS: /86 (BP Location: Left arm, Patient Position: Sitting)   Pulse 67   Ht 182.9 cm (72.01\")   Wt 80.4 kg (177 lb 3.2 oz)   SpO2 98%   BMI 24.03 kg/m²     LABS:   Lab Results (most recent)     None          IMAGING:   No Images in the past 120 days found..    EXAM:  Physical Exam  Vitals and nursing note reviewed.   Constitutional:       Appearance: He is well-developed.   HENT:      Head: Normocephalic.   Eyes:      Pupils: Pupils are equal, round, and reactive to light.   Neck:      Thyroid: No thyroid mass.      Vascular: No carotid bruit or JVD.      Trachea: Trachea and phonation normal.   Cardiovascular:      Rate and Rhythm: Normal rate and regular rhythm.      Pulses:           Radial pulses are 2+ on the right side and 2+ on the left side.        Posterior tibial pulses are 2+ on the right side and 2+ on the left side.      Heart sounds: Normal heart sounds. No murmur heard.   No friction rub. No gallop.    Pulmonary:      Effort: Pulmonary effort is normal. No respiratory distress.      Breath sounds: Normal breath sounds. No wheezing or rales.   Abdominal:      General: Bowel sounds are normal.      Palpations: Abdomen is soft.   Musculoskeletal:         General: No swelling. Normal range of motion.      Cervical back: Neck supple.   Skin:     General: Skin is warm and dry.      Capillary Refill: Capillary refill takes less than 2 seconds.      Findings: No rash.   Neurological:      Mental Status: He is alert and oriented to person, " place, and time.   Psychiatric:         Speech: Speech normal.         Behavior: Behavior normal.         Thought Content: Thought content normal.         Judgment: Judgment normal.         Procedure     ECG 12 Lead    Date/Time: 9/16/2021 10:53 AM  Performed by: Brandon Ochoa APRN  Authorized by: Brandon Ochoa APRN   Comparison: compared with previous ECG from 9/25/2020  Rhythm: paced  Rate: normal  BPM: 65  QRS axis: indeterminate  Comments: Atrially paced    No acute changes               Assessment/Plan    Diagnosis Plan   1. Atrial fibrillation with RVR (CMS/HCC)  flecainide (TAMBOCOR) 100 MG tablet   2. Atrial fibrillation, unspecified type (CMS/HCC)  metoprolol succinate XL (Toprol XL) 100 MG 24 hr tablet   3. Palpitations  metoprolol succinate XL (Toprol XL) 100 MG 24 hr tablet   1.  Patient does have a history of atrial fibrillation with RVR as well as sick sinus syndrome in which she does have a pacemaker in place.  Today he is atrially paced at a rate of 65.  He reports very rare occasions where he has breakthrough atrial fibrillation.  He is being rate controlled with metoprolol and rhythm controlled with flecainide.  Overall he feels he is doing well.  His last interrogation it did show that he had about 8% burden of atrial fibrillation that mainly occurred from the hours of 4-6.  He is currently anticoagulated with aspirin.  We did discuss anticoagulation therapy of Eliquis due to a [id number] of 2 due to age and diastolic dysfunction.  He wishes to defer at this time.  2.  He is doing well from a cardiovascular standpoint no further work-up is needed at this time.  We will continue to monitor.  3.  Informed of signs and symptoms of ACS and advised to seek emergent treatment for any new worsening symptoms.  Patient also advised sooner follow-up as needed.  Also advised to follow-up with family doctor as needed  This note is dictated utilizing voice recognition software.  Although this  record has been proof read, transcriptional errors may still be present. If questions occur regarding the content of this record please do not hesitate to call our office.  I have reviewed and confirmed the accuracy of the ROS as documented by the MA/LPN/RN MUMTAZ Morgan    Return in about 6 months (around 3/16/2022), or if symptoms worsen or fail to improve.    Diagnoses and all orders for this visit:    1. Atrial fibrillation with RVR (CMS/HCC)  -     flecainide (TAMBOCOR) 100 MG tablet; Take 1 tablet by mouth 2 (Two) Times a Day.  Dispense: 180 tablet; Refill: 3    2. Atrial fibrillation, unspecified type (CMS/HCC)  -     metoprolol succinate XL (Toprol XL) 100 MG 24 hr tablet; Take 1 tablet by mouth Daily.  Dispense: 90 tablet; Refill: 3    3. Palpitations  -     metoprolol succinate XL (Toprol XL) 100 MG 24 hr tablet; Take 1 tablet by mouth Daily.  Dispense: 90 tablet; Refill: 3    Other orders  -     ECG 12 Lead        Advance Care Planning   ACP discussion was held with the patient during this visit. Patient has an advance directive (not in EMR), copy requested.       MEDS ORDERED DURING VISIT:  New Medications Ordered This Visit   Medications   • flecainide (TAMBOCOR) 100 MG tablet     Sig: Take 1 tablet by mouth 2 (Two) Times a Day.     Dispense:  180 tablet     Refill:  3   • metoprolol succinate XL (Toprol XL) 100 MG 24 hr tablet     Sig: Take 1 tablet by mouth Daily.     Dispense:  90 tablet     Refill:  3           This document has been electronically signed by MUMTAZ Morgan Jr.  September 16, 2021 12:57 EDT

## 2021-09-24 ENCOUNTER — OFFICE VISIT (OUTPATIENT)
Dept: CARDIOLOGY | Facility: CLINIC | Age: 66
End: 2021-09-24

## 2021-09-24 DIAGNOSIS — I49.5 SICK SINUS SYNDROME (HCC): Primary | ICD-10-CM

## 2021-09-24 PROCEDURE — 93288 INTERROG EVL PM/LDLS PM IP: CPT | Performed by: INTERNAL MEDICINE

## 2021-10-05 ENCOUNTER — TELEPHONE (OUTPATIENT)
Dept: CARDIOLOGY | Facility: CLINIC | Age: 66
End: 2021-10-05

## 2021-10-05 NOTE — TELEPHONE ENCOUNTER
"Patient called triage - asking about Eliquis - he said at his last visit with JR they talked about adding it an a RX was suppose to be sent in...       Per chart and note review No RX was sent and per JR \" Due to [id number] of 2 due to age and diastolic dysfunction. He wishes to defer at this time. \"       I will speak to Juan Pablo MONTALVO on above note and call patient with an answer.       Spoke to Juan Pablo MONTALVO - OK for patient to start on Eliquis we will send in the RX to [facility] if it needs a PA we will get it started .     Spoke to patient - he has enough samples to make it to Thursday - I told him we will put some samples back and will let him know when we get the Eliquis approved.     Patient verbalized understanding.       Will send to Kenzie so she will know what is going on.       [facility]   "

## 2021-10-19 ENCOUNTER — TELEPHONE (OUTPATIENT)
Dept: CARDIOLOGY | Facility: CLINIC | Age: 66
End: 2021-10-19

## 2021-10-19 NOTE — TELEPHONE ENCOUNTER
Called patient about Eliquis samples, he stated he was told to not take it and doesn't need the samples.

## 2022-03-25 ENCOUNTER — OFFICE VISIT (OUTPATIENT)
Dept: CARDIOLOGY | Facility: CLINIC | Age: 67
End: 2022-03-25

## 2022-03-25 VITALS
BODY MASS INDEX: 24.19 KG/M2 | HEART RATE: 81 BPM | DIASTOLIC BLOOD PRESSURE: 74 MMHG | WEIGHT: 178.6 LBS | OXYGEN SATURATION: 98 % | SYSTOLIC BLOOD PRESSURE: 122 MMHG | HEIGHT: 72 IN

## 2022-03-25 DIAGNOSIS — I48.91 ATRIAL FIBRILLATION WITH RVR: ICD-10-CM

## 2022-03-25 DIAGNOSIS — I45.5 SINUS PAUSE: ICD-10-CM

## 2022-03-25 DIAGNOSIS — R56.9 SEIZURES: ICD-10-CM

## 2022-03-25 DIAGNOSIS — R00.2 PALPITATIONS: ICD-10-CM

## 2022-03-25 DIAGNOSIS — I48.91 ATRIAL FIBRILLATION, UNSPECIFIED TYPE: Primary | ICD-10-CM

## 2022-03-25 DIAGNOSIS — I48.91 ATRIAL FIBRILLATION, UNSPECIFIED TYPE: ICD-10-CM

## 2022-03-25 PROCEDURE — 93288 INTERROG EVL PM/LDLS PM IP: CPT | Performed by: INTERNAL MEDICINE

## 2022-03-25 PROCEDURE — 99213 OFFICE O/P EST LOW 20 MIN: CPT | Performed by: NURSE PRACTITIONER

## 2022-03-25 RX ORDER — METOPROLOL SUCCINATE 100 MG/1
100 TABLET, EXTENDED RELEASE ORAL DAILY
Qty: 90 TABLET | Refills: 3 | Status: SHIPPED | OUTPATIENT
Start: 2022-03-25

## 2022-03-25 RX ORDER — FLECAINIDE ACETATE 100 MG/1
100 TABLET ORAL 2 TIMES DAILY
Qty: 180 TABLET | Refills: 3 | Status: SHIPPED | OUTPATIENT
Start: 2022-03-25 | End: 2023-04-03

## 2022-03-25 NOTE — PROGRESS NOTES
Loli Nuñez is a 66 y.o. male who presents to day for Atrial Fibrillation (6 month f/u, stopped Eliquis, rq EKG on next visit).    CHIEF COMPLIANT  Chief Complaint   Patient presents with   • Atrial Fibrillation     6 month f/u, stopped Eliquis, rq EKG on next visit       Active Problems:  Problem List Items Addressed This Visit        Cardiac and Vasculature    Atrial fibrillation (HCC)    Relevant Medications    metoprolol succinate XL (Toprol XL) 100 MG 24 hr tablet    flecainide (TAMBOCOR) 100 MG tablet    Palpitations    Relevant Medications    metoprolol succinate XL (Toprol XL) 100 MG 24 hr tablet      Other Visit Diagnoses     Atrial fibrillation with RVR (HCC)        Relevant Medications    metoprolol succinate XL (Toprol XL) 100 MG 24 hr tablet    flecainide (TAMBOCOR) 100 MG tablet        Problem list  1.  Paroxysmal atrial fibrillation currently on flecainide therapy  2.  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  Preserved systolic function  4.  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 Gaines is a 66-year-old male patient who is being followed up today for atrial fibrillation and pacemaker interrogation.  Patient does have a history of atrial fibrillation we does have an intermittent racing type sensation in his chest.  He does have associated shortness of breath.  During his interrogation it was noted that he had 5 nonsustained VT which appears to be SVT and 2.5 thousand mode switches.  He is atrially paced 31% of the time and ventricularly paced 7% of the time.  He does report some shortness of breath that is improved and only occurs with activity has associated cough.  He is increased his activity over the last month where he is walking 6-8 laps around the mall at a brisk pace.  He has been quite a bit more active.  He is did discontinue his Eliquis for anticoagulation due to a contraindication with  his Dilantin.  We did discuss this in detail and it appears as if the combination may increase the effectiveness or decrease the effectiveness of Eliquis eventually put him at higher risk of bleeding.  He does have a [id number] score of 2.  We also discussed Coumadin is an option and he wishes to continue the aspirin 81 mg daily at this time.  He is also on metoprolol 100 mg daily as well as flecainide 100 mg twice daily for his atrial fibrillation.  He denies chest pain, lower extremity edema, dizziness, lightheadedness, syncope, PND, orthopnea, or strokelike symptoms.  PRIOR MEDS  Current Outpatient Medications on File Prior to Visit   Medication Sig Dispense Refill   • aspirin 81 MG tablet Take 81 mg by mouth.     • phenytoin (DILANTIN) 100 MG ER capsule 400 mg Daily.     • polyethylene glycol (MIRALAX) packet Take 17 g by mouth Daily.     • [DISCONTINUED] flecainide (TAMBOCOR) 100 MG tablet Take 1 tablet by mouth 2 (Two) Times a Day. 180 tablet 3   • [DISCONTINUED] metoprolol succinate XL (Toprol XL) 100 MG 24 hr tablet Take 1 tablet by mouth Daily. 90 tablet 3   • [DISCONTINUED] apixaban (ELIQUIS) 5 MG tablet tablet Take 1 tablet by mouth 2 (two) times a day. 60 tablet 3     No current facility-administered medications on file prior to visit.       ALLERGIES  Doxycycline    HISTORY  Past Medical History:   Diagnosis Date   • Aneurysm (HCC)    • Atrial fibrillation (HCC)    • Hyperlipidemia    • Iliac artery aneurysm (HCC)        Social History     Socioeconomic History   • Marital status:    Tobacco Use   • Smoking status: Current Some Day Smoker     Packs/day: 0.25     Types: Cigarettes   • Smokeless tobacco: Former User   • Tobacco comment: smokes approx. < 1/2 PPD for approx. 30 yrs.   Substance and Sexual Activity   • Alcohol use: Yes     Comment: occas. beer   • Drug use: Never   • Sexual activity: Defer       Family History   Problem Relation Age of Onset   • Lung cancer Mother    • Osteoporosis  "Mother    • Heart attack Father    • Coronary artery disease Father        Review of Systems   Constitutional: Positive for diaphoresis ( night sweats) and fatigue. Negative for chills and fever.   HENT: Negative.    Eyes: Negative.  Negative for visual disturbance.   Respiratory: Positive for cough and shortness of breath (associated with afib). Negative for apnea, chest tightness and wheezing.    Cardiovascular: Positive for palpitations (afib). Negative for chest pain and leg swelling.   Gastrointestinal: Negative.  Negative for abdominal pain, blood in stool, constipation, diarrhea, nausea and vomiting.   Endocrine: Negative.    Genitourinary: Negative.  Negative for hematuria.   Musculoskeletal: Positive for arthralgias. Negative for back pain, myalgias and neck pain.   Skin: Negative.    Allergic/Immunologic: Negative.    Neurological: Negative.  Negative for dizziness, syncope, weakness, light-headedness, numbness and headaches.   Hematological: Bruises/bleeds easily.   Psychiatric/Behavioral: Negative.  Negative for sleep disturbance.       Objective     VITALS: /74 (BP Location: Left arm, Patient Position: Sitting)   Pulse 81   Ht 182.9 cm (72.01\")   Wt 81 kg (178 lb 9.6 oz)   SpO2 98%   BMI 24.22 kg/m²     LABS:   Lab Results (most recent)     None          IMAGING:   No Images in the past 120 days found..    EXAM:  Physical Exam  Vitals and nursing note reviewed.   Constitutional:       Appearance: He is well-developed.   HENT:      Head: Normocephalic.   Eyes:      Pupils: Pupils are equal, round, and reactive to light.   Neck:      Thyroid: No thyroid mass.      Vascular: No carotid bruit or JVD.      Trachea: Trachea and phonation normal.   Cardiovascular:      Rate and Rhythm: Normal rate and regular rhythm.      Pulses:           Radial pulses are 2+ on the right side and 2+ on the left side.        Posterior tibial pulses are 2+ on the right side and 2+ on the left side.      Heart " sounds: Normal heart sounds. No murmur heard.    No friction rub. No gallop.   Pulmonary:      Effort: Pulmonary effort is normal. No respiratory distress.      Breath sounds: Normal breath sounds. No wheezing or rales.   Abdominal:      General: Bowel sounds are normal.      Palpations: Abdomen is soft.   Musculoskeletal:         General: Normal range of motion.      Cervical back: Neck supple.   Skin:     General: Skin is warm and dry.      Capillary Refill: Capillary refill takes less than 2 seconds.      Findings: No rash.   Neurological:      Mental Status: He is alert and oriented to person, place, and time.   Psychiatric:         Speech: Speech normal.         Behavior: Behavior normal.         Thought Content: Thought content normal.         Judgment: Judgment normal.         Procedure   Procedures       Assessment/Plan    Diagnosis Plan   1. Atrial fibrillation, unspecified type (HCC)  metoprolol succinate XL (Toprol XL) 100 MG 24 hr tablet   2. Palpitations  metoprolol succinate XL (Toprol XL) 100 MG 24 hr tablet   3. Atrial fibrillation with RVR (HCC)  flecainide (TAMBOCOR) 100 MG tablet    1.  Patient does have atrial fibrillation when she is anticoagulated with aspirin.  He is unable to take NOAC therapy due to interaction with Dilantin.  We did discuss Coumadin therapy when she wishes to defer.  He will continue aspirin 81 mg daily.  He is on flecainide 100 mg twice daily as well as metoprolol 100 mg daily.  This does not seem to be controlling his atrial fibrillation we did discuss potential switching him to sotalol.  We will continue to monitor over the next 3 months as he increases his activity and further evaluate and discuss at that time.  We will also have his pacemaker interrogated at 3 months.  Instead of 6 to determine if his lifestyle modifications have significantly decreased his atrial fibrillation or SVT.  2.  Informed of signs and symptoms of ACS and advised to seek emergent treatment for  any new worsening symptoms.  Patient also advised sooner follow-up as needed.  Also advised to follow-up with family doctor as needed  This note is dictated utilizing voice recognition software.  Although this record has been proof read, transcriptional errors may still be present. If questions occur regarding the content of this record please do not hesitate to call our office.  I have reviewed and confirmed the accuracy of the ROS as documented by the MA/LPN/RN MUTMAZ Morgan  Return in about 3 months (around 6/25/2022), or if symptoms worsen or fail to improve.    Diagnoses and all orders for this visit:    1. Atrial fibrillation, unspecified type (HCC)  -     metoprolol succinate XL (Toprol XL) 100 MG 24 hr tablet; Take 1 tablet by mouth Daily.  Dispense: 90 tablet; Refill: 3    2. Palpitations  -     metoprolol succinate XL (Toprol XL) 100 MG 24 hr tablet; Take 1 tablet by mouth Daily.  Dispense: 90 tablet; Refill: 3    3. Atrial fibrillation with RVR (HCC)  -     flecainide (TAMBOCOR) 100 MG tablet; Take 1 tablet by mouth 2 (Two) Times a Day.  Dispense: 180 tablet; Refill: 3          Advance Care Planning   ACP discussion was held with the patient during this visit. Patient does not have an advance directive, declines further assistance.            MEDS ORDERED DURING VISIT:  New Medications Ordered This Visit   Medications   • metoprolol succinate XL (Toprol XL) 100 MG 24 hr tablet     Sig: Take 1 tablet by mouth Daily.     Dispense:  90 tablet     Refill:  3   • flecainide (TAMBOCOR) 100 MG tablet     Sig: Take 1 tablet by mouth 2 (Two) Times a Day.     Dispense:  180 tablet     Refill:  3           This document has been electronically signed by MUMTAZ Morgan Jr.  March 25, 2022 11:52 EDT

## 2022-06-24 ENCOUNTER — OFFICE VISIT (OUTPATIENT)
Dept: CARDIOLOGY | Facility: CLINIC | Age: 67
End: 2022-06-24

## 2022-06-24 VITALS
DIASTOLIC BLOOD PRESSURE: 74 MMHG | BODY MASS INDEX: 23.57 KG/M2 | OXYGEN SATURATION: 97 % | SYSTOLIC BLOOD PRESSURE: 119 MMHG | WEIGHT: 174 LBS | HEIGHT: 72 IN | HEART RATE: 67 BPM

## 2022-06-24 DIAGNOSIS — I48.91 ATRIAL FIBRILLATION, UNSPECIFIED TYPE: Primary | ICD-10-CM

## 2022-06-24 DIAGNOSIS — I47.29 NONSUSTAINED VENTRICULAR TACHYCARDIA: ICD-10-CM

## 2022-06-24 DIAGNOSIS — I48.0 PAROXYSMAL ATRIAL FIBRILLATION: Primary | ICD-10-CM

## 2022-06-24 DIAGNOSIS — R00.2 PALPITATIONS: ICD-10-CM

## 2022-06-24 DIAGNOSIS — I48.0 PAROXYSMAL ATRIAL FIBRILLATION: ICD-10-CM

## 2022-06-24 DIAGNOSIS — I45.5 SINUS PAUSE: ICD-10-CM

## 2022-06-24 DIAGNOSIS — I49.5 SICK SINUS SYNDROME: ICD-10-CM

## 2022-06-24 DIAGNOSIS — Z95.0 PRESENCE OF CARDIAC PACEMAKER: ICD-10-CM

## 2022-06-24 PROCEDURE — 93288 INTERROG EVL PM/LDLS PM IP: CPT | Performed by: INTERNAL MEDICINE

## 2022-06-24 PROCEDURE — 99213 OFFICE O/P EST LOW 20 MIN: CPT | Performed by: NURSE PRACTITIONER

## 2022-06-24 RX ORDER — AMITRIPTYLINE HYDROCHLORIDE 25 MG/1
25 TABLET, FILM COATED ORAL NIGHTLY
COMMUNITY
Start: 2022-06-20

## 2022-06-24 NOTE — PROGRESS NOTES
Loli Nuñez is a 66 y.o. male who presents to day for Atrial Fibrillation and 3 month follow up .    CHIEF COMPLIANT  Chief Complaint   Patient presents with   • Atrial Fibrillation   • 3 month follow up        Active Problems:  Problem List Items Addressed This Visit        Cardiac and Vasculature    Atrial fibrillation (HCC) - Primary    Palpitations      Other Visit Diagnoses     Nonsustained ventricular tachycardia (HCC)          Problem list  1.  Paroxysmal atrial fibrillation currently on flecainide therapy  2.  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  Preserved systolic function  4.  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 Gaines is a 66-year-old male patient who is being followed up today for atrial fibrillation and pacemaker interrogation.  Patient does have a history of atrial fibrillation in which she does report palpitations that last a few seconds and then goes away.  He has chosen to go with antiplatelet therapy of aspirin.  He is previously been on DOAC therapy.  On his pacemaker interrogation it did note that he had 9% mode switch which equivocal to 2000.  Max was less than 24 hours.  He did have 3 episodes of SVT 13 episodes of nonsustained ventricular tachycardia which appear to be more SVT on evaluation.  He has 7 years left of battery life.  Overall he feels like he is done relatively well from the cardiovascular standpoint.  He denies any chest pain, shortness of breath, lower extremity edema, fatigue, dizziness, lightheadedness, syncope, PND, orthopnea, or strokelike symptoms.            PRIOR MEDS  Current Outpatient Medications on File Prior to Visit   Medication Sig Dispense Refill   • amitriptyline (ELAVIL) 25 MG tablet Take 25 mg by mouth Every Night.     • aspirin 81 MG tablet Take 81 mg by mouth.     • flecainide (TAMBOCOR) 100 MG tablet Take 1 tablet by mouth 2  (Two) Times a Day. 180 tablet 3   • metoprolol succinate XL (Toprol XL) 100 MG 24 hr tablet Take 1 tablet by mouth Daily. 90 tablet 3   • phenytoin (DILANTIN) 100 MG ER capsule 400 mg Daily.     • polyethylene glycol (MIRALAX) packet Take 17 g by mouth Daily.       No current facility-administered medications on file prior to visit.       ALLERGIES  Doxycycline    HISTORY  Past Medical History:   Diagnosis Date   • Aneurysm (HCC)    • Atrial fibrillation (HCC)    • Hyperlipidemia    • Iliac artery aneurysm (HCC)        Social History     Socioeconomic History   • Marital status:    Tobacco Use   • Smoking status: Current Some Day Smoker     Packs/day: 0.25     Types: Cigarettes   • Smokeless tobacco: Former User   • Tobacco comment: smokes approx. < 1/2 PPD for approx. 30 yrs.   Substance and Sexual Activity   • Alcohol use: Yes     Comment: occas. beer   • Drug use: Never   • Sexual activity: Defer       Family History   Problem Relation Age of Onset   • Lung cancer Mother    • Osteoporosis Mother    • Heart attack Father    • Coronary artery disease Father        Review of Systems   Constitutional: Negative for chills, fatigue and fever.   HENT: Negative for congestion, rhinorrhea and sore throat.    Respiratory: Negative for chest tightness and shortness of breath.    Cardiovascular: Positive for palpitations. Negative for chest pain and leg swelling.   Gastrointestinal: Positive for constipation. Negative for diarrhea and nausea.   Musculoskeletal: Positive for arthralgias and back pain. Negative for neck pain.   Allergic/Immunologic: Positive for environmental allergies. Negative for food allergies.   Neurological: Negative for dizziness, syncope, weakness and light-headedness.   Hematological: Bruises/bleeds easily (bruise).   Psychiatric/Behavioral: Negative for sleep disturbance.       Objective     VITALS: /74 (BP Location: Left arm, Patient Position: Sitting)   Pulse 67   Ht 182.9 cm  "(72.01\")   Wt 78.9 kg (174 lb)   SpO2 97%   BMI 23.59 kg/m²     LABS:   Lab Results (most recent)     None          IMAGING:   No Images in the past 120 days found..    EXAM:  Physical Exam  Vitals and nursing note reviewed.   Constitutional:       Appearance: He is well-developed.   HENT:      Head: Normocephalic.      Nose: Nose normal.   Eyes:      Pupils: Pupils are equal, round, and reactive to light.   Neck:      Thyroid: No thyroid mass.      Vascular: No carotid bruit or JVD.      Trachea: Trachea and phonation normal.   Cardiovascular:      Rate and Rhythm: Normal rate and regular rhythm.      Pulses:           Radial pulses are 2+ on the right side and 2+ on the left side.        Posterior tibial pulses are 2+ on the right side and 2+ on the left side.      Heart sounds: Normal heart sounds. No murmur heard.    No friction rub. No gallop.   Pulmonary:      Effort: Pulmonary effort is normal. No respiratory distress.      Breath sounds: Normal breath sounds. No wheezing or rales.   Abdominal:      General: Bowel sounds are normal.      Palpations: Abdomen is soft.   Musculoskeletal:         General: Normal range of motion.      Cervical back: Normal range of motion and neck supple.   Skin:     General: Skin is warm and dry.      Capillary Refill: Capillary refill takes less than 2 seconds.      Findings: No rash.   Neurological:      General: No focal deficit present.      Mental Status: He is alert and oriented to person, place, and time.   Psychiatric:         Mood and Affect: Mood normal.         Speech: Speech normal.         Behavior: Behavior normal.         Thought Content: Thought content normal.         Judgment: Judgment normal.         Procedure     ECG 12 Lead    Date/Time: 6/25/2022 5:16 PM  Performed by: Brandon Ochoa APRN  Authorized by: Brandon Ochoa APRN   Rhythm: paced  Rate: normal  BPM: 70  QRS axis: normal  Comments:  ms  No acute changes               Assessment & Plan "    Diagnosis Plan   1. Paroxysmal atrial fibrillation (HCC)     2. Palpitations     3. Nonsustained ventricular tachycardia (HCC)     1.  Patient does have paroxysmal SVT in which she is on antiplatelet therapy of aspirin 81 mg daily and on antiarrhythmic of flecainide 100 mg twice daily and rate controlled with metoprolol 100 mg daily.  He continues to have palpitations and 2000 mode switches on his device check.  We did discuss switching his flecainide to sotalol in which she wishes to defer at this time.  2.  Informed of signs and symptoms of ACS and advised to seek emergent treatment for any new worsening symptoms.  Patient also advised sooner follow-up as needed.  Also advised to follow-up with family doctor as needed  This note is dictated utilizing voice recognition software.  Although this record has been proof read, transcriptional errors may still be present. If questions occur regarding the content of this record please do not hesitate to call our office.  I have reviewed and confirmed the accuracy of the ROS as documented by the MA/ANAN/RN MUMTAZ Morgan    Return in about 6 months (around 12/24/2022), or if symptoms worsen or fail to improve.    Diagnoses and all orders for this visit:    1. Paroxysmal atrial fibrillation (HCC) (Primary)    2. Palpitations    3. Nonsustained ventricular tachycardia (HCC)        Jonah Hauserunch  reports that he has been smoking cigarettes. He has been smoking about 0.25 packs per day. He has quit using smokeless tobacco.. I have educated him on the risk of diseases from using tobacco products .    Advance Care Planning   ACP discussion was held with the patient during this visit. Patient does not have an advance directive, declines further assistance.              MEDS ORDERED DURING VISIT:  No orders of the defined types were placed in this encounter.          This document has been electronically signed by MUMTAZ Morgan Jr.  June 25, 2022 17:15 EDT

## 2022-06-25 PROCEDURE — 93000 ELECTROCARDIOGRAM COMPLETE: CPT | Performed by: NURSE PRACTITIONER

## 2022-07-15 ENCOUNTER — TELEPHONE (OUTPATIENT)
Dept: CARDIOLOGY | Facility: CLINIC | Age: 67
End: 2022-07-15

## 2022-07-15 NOTE — TELEPHONE ENCOUNTER
Dr. Pretty noted on the pt's PPM interrogation for 6/24/22 for pt to f/u in 1 month due to potential SVT.  Pt had an appt with JR on 6/24/22.  The PM discussed this with JR and he confirmed he addressed at the pt's appt on 6/24/22 and the pt declined any medication changes.

## 2023-04-01 DIAGNOSIS — I48.91 ATRIAL FIBRILLATION WITH RVR: ICD-10-CM

## 2023-04-03 RX ORDER — FLECAINIDE ACETATE 100 MG/1
TABLET ORAL
Qty: 60 TABLET | Refills: 0 | Status: SHIPPED | OUTPATIENT
Start: 2023-04-03 | End: 2023-04-04 | Stop reason: SDUPTHER

## 2023-04-04 DIAGNOSIS — I48.91 ATRIAL FIBRILLATION WITH RVR: ICD-10-CM

## 2023-04-04 RX ORDER — FLECAINIDE ACETATE 100 MG/1
100 TABLET ORAL 2 TIMES DAILY
Qty: 60 TABLET | Refills: 11 | Status: SHIPPED | OUTPATIENT
Start: 2023-04-04

## 2023-04-28 ENCOUNTER — OFFICE VISIT (OUTPATIENT)
Dept: CARDIOLOGY | Facility: CLINIC | Age: 68
End: 2023-04-28
Payer: COMMERCIAL

## 2023-04-28 DIAGNOSIS — I48.0 PAROXYSMAL ATRIAL FIBRILLATION: ICD-10-CM

## 2023-04-28 DIAGNOSIS — I45.5 SINUS PAUSE: ICD-10-CM

## 2023-05-15 ENCOUNTER — OFFICE VISIT (OUTPATIENT)
Dept: CARDIOLOGY | Facility: CLINIC | Age: 68
End: 2023-05-15
Payer: COMMERCIAL

## 2023-05-15 VITALS — OXYGEN SATURATION: 96 % | HEART RATE: 60 BPM | SYSTOLIC BLOOD PRESSURE: 125 MMHG | DIASTOLIC BLOOD PRESSURE: 77 MMHG

## 2023-05-15 DIAGNOSIS — I48.0 PAROXYSMAL ATRIAL FIBRILLATION: Primary | ICD-10-CM

## 2023-05-15 DIAGNOSIS — R00.2 PALPITATIONS: ICD-10-CM

## 2023-05-15 DIAGNOSIS — Z95.0 PRESENCE OF CARDIAC PACEMAKER: ICD-10-CM

## 2023-05-15 PROCEDURE — 99214 OFFICE O/P EST MOD 30 MIN: CPT | Performed by: NURSE PRACTITIONER

## 2023-05-15 RX ORDER — DILTIAZEM HYDROCHLORIDE 120 MG/1
120 CAPSULE, EXTENDED RELEASE ORAL DAILY
Qty: 30 CAPSULE | Refills: 11 | Status: SHIPPED | OUTPATIENT
Start: 2023-05-15

## 2023-05-15 NOTE — PROGRESS NOTES
Subjective     Jonah Nuñez is a 67 y.o. male who presents to day for Atrial Fibrillation (He also Has Shingles).    CHIEF COMPLIANT  Chief Complaint   Patient presents with   • Atrial Fibrillation     He also Has Shingles       Active Problems:  Problem List Items Addressed This Visit        Cardiac and Vasculature    Atrial fibrillation - Primary    Relevant Medications    dilTIAZem XR (DILACOR XR) 120 MG 24 hr capsule    Palpitations   Other Visit Diagnoses     Presence of cardiac pacemaker          Problem list  1.  Paroxysmal atrial fibrillation currently on flecainide therapy  2.  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  Preserved systolic function  4.  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 The patient presents today for a follow-up for atrial fibrillation. Patient does have a history of atrial fibrillation, in which he is on antiarrhythmic therapy of flecainide, antiplatelet therapy of aspirin, and rate control metoprolol. He does have a [id number] score of 1 due to age. He also has a history of conduction system disease, in which he has a permanent pacemaker in place. The patient's blood pressure today is 125/77 mmHg.    The patient reports he developed shingles and states it has healed well. He notes he saw Dr. Jimenez 1 month ago. He states he was having some soreness and notes Dr. Roth advised him he might have pulled a muscle and the next day it was worse. He maintains the next day he started having redness on his back and 2 days later it was starting to blister. He reports he was taking valacyclovir and it is painful. He states he would like to get the shingles vaccine and adds he had chickenpox when he was a child.    The patient reports he woke up yesterday with his thumb reddened and states it is  tender. He confirms he has a cyst on his thumb that has been there since he was a late teenager. He adds Dr. Jimenez advised him to leave it alone because it could cause problems if they tried to take it out. He maintains he thought he had a blood clot in his thumb, and he does not remember hurting or burning.    The patient states he is still having a lot of atrial fibrillations and adds most of it is at night. He reports 2 to 3 days before he had the pacemaker done, he was told he was in atrial fibrillation most of the day and states it was 28 percent. He maintains it went to 8 percent and he eliminated caffeine. He states he drinks 1 to 2 sodas a day.    The patient's aneurysms thrombosed. He states it was spotted.    The patient reports his blood pressure has always been good and he states he has never had high blood pressure or low blood pressure. He reports historically he has had bradycardia and adds he was in the hospital once in neurology and the doctor kept coming and waking him up. He reports he was fine and states he was checking on his heart rate several times. He notes the doctor told him his heart rate was getting down in the low 40s bpm and he would check on his heart rate.     He maintains when he was working second shift, he would take his lunch break and go down and eat something and he would never like to stuff himself. He reports when he got back to the fifth floor, he jogged up the steps to fifth floor from the basement. He states he checked his pulse and it was 76, sometimes 78 or 80 and it was as high as opposed to jogging 6 stories. He maintains they put him on the treadmill for a stress test and they told him they needed to try to break whatever it was. He states his heart rate was between 100 and 110 bpm. He states he was on the treadmill longer than they thought anybody might should have been and adds he still could not get his heart rate up. He states historically he has a low heart  rate.    The patient denies having sleep apnea. He denies daytime sleepiness and states his wife told him he snores occasionally. He maintains he is aware of what apneic people sound like when they sleep, and they are pretty bad sometimes. He states his wife told him he was snoring a little bit last night.    The patient states he is on the max dose of flecainide 100 mg and adds he has seen his heart rate go up higher, but not him.    The patient reports his father  of a glioblastoma multiforme and adds it lasted 13 months after the brain surgery.    The patient confirms his memory is not good anymore and adds he is getting ready to have 30 more years of life and they will happen. He reports he feels like the battery in his pacemaker would out last him at this point. He notes in the middle of the summer at 1330 in the afternoon, he would be fine. He states the spleen pain could actually affect cardiac function. He states he has lost so much blood. He states he walked 2 units, pack sales, and a bag of fluids under pressure only got him to a 60.    PRIOR MEDS  Current Outpatient Medications on File Prior to Visit   Medication Sig Dispense Refill   • amitriptyline (ELAVIL) 25 MG tablet Take 1 tablet by mouth Every Night.     • aspirin 81 MG tablet Take 1 tablet by mouth.     • flecainide (TAMBOCOR) 100 MG tablet Take 1 tablet by mouth 2 (Two) Times a Day. 60 tablet 11   • metoprolol succinate XL (Toprol XL) 100 MG 24 hr tablet Take 1 tablet by mouth Daily. 90 tablet 3   • phenytoin (DILANTIN) 100 MG ER capsule 4 capsules Daily.     • polyethylene glycol (MIRALAX) packet Take 17 g by mouth Daily.       No current facility-administered medications on file prior to visit.       ALLERGIES  Doxycycline    HISTORY  Past Medical History:   Diagnosis Date   • Aneurysm    • Atrial fibrillation    • Hyperlipidemia    • Iliac artery aneurysm        Social History     Socioeconomic History   • Marital status:    Tobacco  Use   • Smoking status: Some Days     Packs/day: 0.25     Types: Cigarettes   • Smokeless tobacco: Former   • Tobacco comments:     smokes approx. < 1/2 PPD for approx. 30 yrs.   Substance and Sexual Activity   • Alcohol use: Yes     Comment: occas. beer   • Drug use: Never   • Sexual activity: Defer       Family History   Problem Relation Age of Onset   • Lung cancer Mother    • Osteoporosis Mother    • Heart attack Father    • Coronary artery disease Father        Review of Systems   Constitutional: Negative for appetite change, chills and fever.   HENT: Negative.  Negative for drooling, facial swelling, postnasal drip, rhinorrhea, sinus pressure, sneezing, tinnitus and trouble swallowing.    Eyes: Negative for pain, redness, itching and visual disturbance.   Respiratory: Positive for cough and shortness of breath. Negative for choking, chest tightness and wheezing.    Cardiovascular: Negative for chest pain, palpitations and leg swelling.   Gastrointestinal: Negative for blood in stool, diarrhea, nausea and rectal pain.   Genitourinary: Negative.  Negative for difficulty urinating.   Musculoskeletal: Positive for arthralgias. Negative for back pain and neck pain.   Skin: Negative for rash and wound.   Neurological: Negative for dizziness, weakness and numbness.   Psychiatric/Behavioral: Negative for sleep disturbance.       Objective     VITALS: /77 (BP Location: Right arm, Patient Position: Sitting, Cuff Size: Adult)   Pulse 60   SpO2 96%     LABS:   Lab Results (most recent)     None          IMAGING:   No Images in the past 120 days found..    EXAM:  Physical Exam  Vitals and nursing note reviewed.   Constitutional:       Appearance: He is well-developed.   HENT:      Head: Normocephalic and atraumatic.   Eyes:      Pupils: Pupils are equal, round, and reactive to light.   Neck:      Vascular: No carotid bruit or JVD.   Cardiovascular:      Rate and Rhythm: Normal rate and regular rhythm.      Pulses:            Carotid pulses are 2+ on the right side and 2+ on the left side.       Radial pulses are 2+ on the right side and 2+ on the left side.        Posterior tibial pulses are 2+ on the right side and 2+ on the left side.      Heart sounds: Normal heart sounds. No murmur heard.    No gallop.   Pulmonary:      Effort: Pulmonary effort is normal. No respiratory distress.      Breath sounds: Normal breath sounds.   Abdominal:      General: Bowel sounds are normal. There is no distension.      Palpations: Abdomen is soft.      Tenderness: There is no abdominal tenderness.   Musculoskeletal:         General: No swelling. Normal range of motion.      Cervical back: Neck supple.   Skin:     General: Skin is warm and dry.   Neurological:      Mental Status: He is alert and oriented to person, place, and time.      Cranial Nerves: No cranial nerve deficit.      Sensory: No sensory deficit.   Psychiatric:         Speech: Speech normal.         Behavior: Behavior normal.         Thought Content: Thought content normal.         Judgment: Judgment normal.         Procedure   Procedures       Assessment & Plan       Diagnosis Plan   1. Paroxysmal atrial fibrillation        2. Palpitations        3. Presence of cardiac pacemaker        1. The patient's recent cardiac testing results were discussed in full detail with him.  2. The patient's pacemaker evaluation results were discussed in full detail with him.  3. The patient's metoprolol dose will be continued at current dosing.  Due to continued episodes of A-fib with RVR with rates up to 160s. A prescription for diltiazem 120 mg daily 1 tablet daily will be sent to the patient's pharmacy.  5.  Informed of signs and symptoms of ACS and advised to seek emergent treatment for any new worsening symptoms.  Patient also advised sooner follow-up as needed.  Also advised to follow-up with family doctor as needed  This note is dictated utilizing voice recognition software.  Although  this record has been proof read, transcriptional errors may still be present. If questions occur regarding the content of this record please do not hesitate to call our office.  I have reviewed and confirmed the accuracy of the ROS as documented by the MA/LPN/RN MUMTAZ Morgan    Assessment  1. Atrial fibrillation  2. Thrombocytosis  3. Coronary artery disease  4. Chronic arterial hypertension  5. Dizziness      Return in about 6 months (around 11/15/2023), or if symptoms worsen or fail to improve.    Diagnoses and all orders for this visit:    1. Paroxysmal atrial fibrillation (Primary)    2. Palpitations    3. Presence of cardiac pacemaker    Other orders  -     dilTIAZem XR (DILACOR XR) 120 MG 24 hr capsule; Take 1 capsule by mouth Daily.  Dispense: 30 capsule; Refill: 11        Cy Savannah  reports that he has been smoking cigarettes. He has been smoking an average of .25 packs per day. He has quit using smokeless tobacco.. I have educated him on the risk of diseases from using tobacco products.         Advance Care Planning   ACP discussion was declined by the patient. Patient does not have an advance directive, declines further assistance.       MEDS ORDERED DURING VISIT:  New Medications Ordered This Visit   Medications   • dilTIAZem XR (DILACOR XR) 120 MG 24 hr capsule     Sig: Take 1 capsule by mouth Daily.     Dispense:  30 capsule     Refill:  11           This document has been electronically signed by MUMTAZ Morgan Jr.  May 15, 2023 22:14 EDT     Transcribed from ambient dictation for MUMTAZ Morgan by Pancho Covarrubias.  05/15/23   14:15 EDT    Patient or patient representative verbalized consent to the visit recording.  I have personally performed the services described in this document as transcribed by the above individual, and it is both accurate and complete.

## 2023-05-18 DIAGNOSIS — I48.0 PAROXYSMAL ATRIAL FIBRILLATION: ICD-10-CM

## 2023-05-18 DIAGNOSIS — Z95.0 PRESENCE OF CARDIAC PACEMAKER: ICD-10-CM

## 2023-05-18 DIAGNOSIS — R00.2 PALPITATIONS: ICD-10-CM

## 2023-05-30 ENCOUNTER — TELEPHONE (OUTPATIENT)
Dept: CARDIOLOGY | Facility: CLINIC | Age: 68
End: 2023-05-30

## 2023-05-30 DIAGNOSIS — R00.2 PALPITATIONS: ICD-10-CM

## 2023-05-30 DIAGNOSIS — I48.91 ATRIAL FIBRILLATION, UNSPECIFIED TYPE: ICD-10-CM

## 2023-05-30 RX ORDER — DILTIAZEM HYDROCHLORIDE 120 MG/1
120 CAPSULE, EXTENDED RELEASE ORAL DAILY
Qty: 90 CAPSULE | Refills: 3 | Status: SHIPPED | OUTPATIENT
Start: 2023-05-30

## 2023-05-30 RX ORDER — METOPROLOL SUCCINATE 100 MG/1
100 TABLET, EXTENDED RELEASE ORAL DAILY
Qty: 90 TABLET | Refills: 3 | Status: SHIPPED | OUTPATIENT
Start: 2023-05-30

## 2023-05-30 NOTE — TELEPHONE ENCOUNTER
PT came into the office requesting a refill on metoprolol sent to [facility], he is completely out, he took his last one last night. Pt also stated [facility] needs 3 month at a time on diltiazen to be called in. Thank you.

## 2023-08-17 PROCEDURE — 93294 REM INTERROG EVL PM/LDLS PM: CPT | Performed by: INTERNAL MEDICINE

## 2023-08-17 PROCEDURE — 93296 REM INTERROG EVL PM/IDS: CPT | Performed by: INTERNAL MEDICINE

## 2023-10-27 ENCOUNTER — CLINICAL SUPPORT NO REQUIREMENTS (OUTPATIENT)
Dept: CARDIOLOGY | Facility: CLINIC | Age: 68
End: 2023-10-27
Payer: COMMERCIAL

## 2023-10-27 DIAGNOSIS — I45.5 SINUS PAUSE: Primary | ICD-10-CM

## 2023-11-15 ENCOUNTER — OFFICE VISIT (OUTPATIENT)
Dept: CARDIOLOGY | Facility: CLINIC | Age: 68
End: 2023-11-15
Payer: COMMERCIAL

## 2023-11-15 VITALS
WEIGHT: 195 LBS | BODY MASS INDEX: 26.41 KG/M2 | HEIGHT: 72 IN | OXYGEN SATURATION: 97 % | DIASTOLIC BLOOD PRESSURE: 77 MMHG | SYSTOLIC BLOOD PRESSURE: 118 MMHG | HEART RATE: 71 BPM

## 2023-11-15 DIAGNOSIS — E78.2 MIXED HYPERLIPIDEMIA: ICD-10-CM

## 2023-11-15 DIAGNOSIS — R00.2 PALPITATIONS: ICD-10-CM

## 2023-11-15 DIAGNOSIS — I48.91 ATRIAL FIBRILLATION WITH RVR: ICD-10-CM

## 2023-11-15 DIAGNOSIS — I48.91 ATRIAL FIBRILLATION, UNSPECIFIED TYPE: ICD-10-CM

## 2023-11-15 DIAGNOSIS — I48.0 PAROXYSMAL ATRIAL FIBRILLATION: Primary | ICD-10-CM

## 2023-11-15 PROCEDURE — 93000 ELECTROCARDIOGRAM COMPLETE: CPT | Performed by: NURSE PRACTITIONER

## 2023-11-15 PROCEDURE — 99214 OFFICE O/P EST MOD 30 MIN: CPT | Performed by: NURSE PRACTITIONER

## 2023-11-15 RX ORDER — DILTIAZEM HYDROCHLORIDE 120 MG/1
120 CAPSULE, EXTENDED RELEASE ORAL DAILY
Qty: 90 CAPSULE | Refills: 3 | Status: SHIPPED | OUTPATIENT
Start: 2023-11-15

## 2023-11-15 RX ORDER — FLECAINIDE ACETATE 100 MG/1
100 TABLET ORAL 2 TIMES DAILY
Qty: 180 TABLET | Refills: 3 | Status: SHIPPED | OUTPATIENT
Start: 2023-11-15

## 2023-11-15 RX ORDER — METOPROLOL SUCCINATE 100 MG/1
100 TABLET, EXTENDED RELEASE ORAL DAILY
Qty: 90 TABLET | Refills: 3 | Status: SHIPPED | OUTPATIENT
Start: 2023-11-15

## 2023-11-15 NOTE — PROGRESS NOTES
Subjective     Jonah Nuñez is a 68 y.o. male who presents to day for 6 month follow up  and Atrial Fibrillation.    CHIEF COMPLIANT  Chief Complaint   Patient presents with    6 month follow up     Atrial Fibrillation       Active Problems:  Problem List Items Addressed This Visit          Cardiac and Vasculature    Atrial fibrillation - Primary    Relevant Medications    metoprolol succinate XL (Toprol XL) 100 MG 24 hr tablet    flecainide (TAMBOCOR) 100 MG tablet    dilTIAZem XR (DILACOR XR) 120 MG 24 hr capsule    Other Relevant Orders    ECG 12 Lead    ECG 12 Lead    Mixed hyperlipidemia    Relevant Orders    ECG 12 Lead    Palpitations    Relevant Medications    metoprolol succinate XL (Toprol XL) 100 MG 24 hr tablet    Other Relevant Orders    ECG 12 Lead     Other Visit Diagnoses       Atrial fibrillation with RVR        Relevant Medications    metoprolol succinate XL (Toprol XL) 100 MG 24 hr tablet    flecainide (TAMBOCOR) 100 MG tablet    dilTIAZem XR (DILACOR XR) 120 MG 24 hr capsule    Other Relevant Orders    ECG 12 Lead        Problem list  1.  Paroxysmal atrial fibrillation currently on flecainide therapy  2.  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  Preserved systolic function  4.  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  Patient does have a history of atrial fibrillation in which she is on antiplatelet therapy aspirin.  He does have a [id number] score of 1 due to age.  Rate controlled with diltiazem and metoprolol.  He is on flecainide for rhythm control.  He has been not been able to do DOAC therapy due to the fact that he is on Dilantin and there is a contraindication as well.    We also reviewed patient's most recent and remote pacemaker check.  It showed that he had 16% mode switch with the longest  being less than an hour.  He is paced in the right atrium 26% of the time and right ventricle 11% of the time.  Battery life is 5.5 years.    Patient does have some chronic intermittent shortness of breath that occurs with activity and at times at rest where he has to take some deep breaths to catch up.  Mainly when he is in atrial fibrillation.    Patient does have chronic fatigue where he has no stamina he says he has a strength but even take out the garbage just wears him out.    He also reports that he had an episode of shingles that reoccurred and he has been having some issues with that but currently has resolved.  He is placed on acyclovir at that time.    Patient denies any chest pain, lower extremity edema, fatigue, syncope, PND, orthopnea, or strokelike symptoms.  PRIOR MEDS  Current Outpatient Medications on File Prior to Visit   Medication Sig Dispense Refill    amitriptyline (ELAVIL) 25 MG tablet Take 1 tablet by mouth Every Night.      aspirin 81 MG tablet Take 1 tablet by mouth.      phenytoin (DILANTIN) 100 MG ER capsule Take 4 capsules by mouth Daily. Takes differently at times      polyethylene glycol (MIRALAX) packet Take 17 g by mouth Daily.      [DISCONTINUED] dilTIAZem XR (DILACOR XR) 120 MG 24 hr capsule Take 1 capsule by mouth Daily. 90 capsule 3    [DISCONTINUED] flecainide (TAMBOCOR) 100 MG tablet Take 1 tablet by mouth 2 (Two) Times a Day. 60 tablet 11    [DISCONTINUED] metoprolol succinate XL (Toprol XL) 100 MG 24 hr tablet Take 1 tablet by mouth Daily. 90 tablet 3     No current facility-administered medications on file prior to visit.       ALLERGIES  Doxycycline    HISTORY  Past Medical History:   Diagnosis Date    Aneurysm     Atrial fibrillation     Hyperlipidemia     Iliac artery aneurysm        Social History     Socioeconomic History    Marital status:    Tobacco Use    Smoking status: Some Days     Packs/day: .25     Types: Cigarettes    Smokeless tobacco: Former     "Tobacco comments:     smokes approx. < 1/2 PPD for approx. 30 yrs.   Substance and Sexual Activity    Alcohol use: Yes     Comment: occas. beer    Drug use: Never    Sexual activity: Defer       Family History   Problem Relation Age of Onset    Lung cancer Mother     Osteoporosis Mother     Heart attack Father     Coronary artery disease Father        Review of Systems   Constitutional:  Negative for chills, fatigue and fever.   HENT:  Positive for congestion and rhinorrhea. Negative for sore throat.    Eyes:  Negative for visual disturbance.   Respiratory:  Positive for shortness of breath (off and on). Negative for apnea and chest tightness.    Cardiovascular:  Positive for palpitations (off and on). Negative for chest pain and leg swelling.   Gastrointestinal:  Positive for constipation. Negative for diarrhea and nausea.   Musculoskeletal:  Positive for arthralgias and neck pain. Negative for back pain.   Allergic/Immunologic: Positive for environmental allergies. Negative for food allergies.   Neurological:  Positive for weakness. Negative for dizziness, syncope and light-headedness.   Hematological:  Bruises/bleeds easily.   Psychiatric/Behavioral:  Negative for sleep disturbance.        Objective     VITALS: /77 (BP Location: Left arm, Patient Position: Sitting, Cuff Size: Adult)   Pulse 71   Ht 182.9 cm (72\")   Wt 88.5 kg (195 lb)   SpO2 97%   BMI 26.45 kg/m²     LABS:   Lab Results (most recent)       None            IMAGING:   No Images in the past 120 days found..    EXAM:  Physical Exam  Vitals and nursing note reviewed.   Constitutional:       Appearance: He is well-developed.   HENT:      Head: Normocephalic.   Neck:      Thyroid: No thyroid mass.      Vascular: No carotid bruit or JVD.      Trachea: Trachea and phonation normal.   Cardiovascular:      Rate and Rhythm: Normal rate and regular rhythm.      Pulses:           Radial pulses are 2+ on the right side and 2+ on the left side.        " Posterior tibial pulses are 2+ on the right side and 2+ on the left side.      Heart sounds: Normal heart sounds. No murmur heard.     No friction rub. No gallop.   Pulmonary:      Effort: Pulmonary effort is normal. No respiratory distress.      Breath sounds: Normal breath sounds. No wheezing or rales.   Musculoskeletal:         General: No swelling. Normal range of motion.      Cervical back: Neck supple.   Skin:     General: Skin is warm and dry.      Capillary Refill: Capillary refill takes less than 2 seconds.      Findings: No rash.   Neurological:      Mental Status: He is alert and oriented to person, place, and time.   Psychiatric:         Speech: Speech normal.         Behavior: Behavior normal.         Thought Content: Thought content normal.         Judgment: Judgment normal.         Procedure     ECG 12 Lead    Date/Time: 11/15/2023 11:26 AM  Performed by: Brandon Ochoa APRN    Authorized by: Brandon Ochoa APRN  Comparison: compared with previous ECG from 5/15/2023  Similar to previous ECG  Rhythm: paced  Rate: normal  BPM: 65  QRS axis: normal  Comments: Atrial paced  Qtc 441ms  No acute changes             Assessment & Plan    Diagnosis Plan   1. Paroxysmal atrial fibrillation  ECG 12 Lead      2. Palpitations  ECG 12 Lead    metoprolol succinate XL (Toprol XL) 100 MG 24 hr tablet      3. Mixed hyperlipidemia  ECG 12 Lead      4. Atrial fibrillation, unspecified type  ECG 12 Lead    metoprolol succinate XL (Toprol XL) 100 MG 24 hr tablet      5. Atrial fibrillation with RVR  ECG 12 Lead    flecainide (TAMBOCOR) 100 MG tablet      1.  Patient's atrial fibrillation is approximately 16% per device interrogation.  He does have some episodes of atrial fibrillation while on flecainide metoprolol diltiazem.  Overall is unchanged nonprogressive.  He says 16% is actually down from what his previous check was.  We will continue his current medications without change.  2.  Patient to be admitted for the  cardiovascular standpoint.  He denies any angina anginal equivalent symptoms.  We will continue to follow.  3.  Informed of signs and symptoms of ACS and advised to seek emergent treatment for any new worsening symptoms.  Patient also advised sooner follow-up as needed.  Also advised to follow-up with family doctor as needed  This note is dictated utilizing voice recognition software.  Although this record has been proof read, transcriptional errors may still be present. If questions occur regarding the content of this record please do not hesitate to call our office.  I have reviewed and confirmed the accuracy of the ROS as documented by the MA/LPN/RN MUMTAZ Morgan    Return if symptoms worsen or fail to improve, for Next scheduled follow up.    Diagnoses and all orders for this visit:    1. Paroxysmal atrial fibrillation (Primary)  -     ECG 12 Lead    2. Palpitations  -     ECG 12 Lead  -     metoprolol succinate XL (Toprol XL) 100 MG 24 hr tablet; Take 1 tablet by mouth Daily.  Dispense: 90 tablet; Refill: 3    3. Mixed hyperlipidemia  -     ECG 12 Lead    4. Atrial fibrillation, unspecified type  -     ECG 12 Lead  -     metoprolol succinate XL (Toprol XL) 100 MG 24 hr tablet; Take 1 tablet by mouth Daily.  Dispense: 90 tablet; Refill: 3    5. Atrial fibrillation with RVR  -     ECG 12 Lead  -     flecainide (TAMBOCOR) 100 MG tablet; Take 1 tablet by mouth 2 (Two) Times a Day.  Dispense: 180 tablet; Refill: 3    Other orders  -     dilTIAZem XR (DILACOR XR) 120 MG 24 hr capsule; Take 1 capsule by mouth Daily.  Dispense: 90 capsule; Refill: 3        Jonah Nuñez  reports that he has been smoking cigarettes. He has been smoking an average of .25 packs per day. He has quit using smokeless tobacco.. I have educated him on the risk of diseases from using tobacco products .    Advance Care Planning   ACP discussion was held with the patient during this visit. Patient does not have an advance directive, declines  further assistance.             MEDS ORDERED DURING VISIT:  New Medications Ordered This Visit   Medications    metoprolol succinate XL (Toprol XL) 100 MG 24 hr tablet     Sig: Take 1 tablet by mouth Daily.     Dispense:  90 tablet     Refill:  3    flecainide (TAMBOCOR) 100 MG tablet     Sig: Take 1 tablet by mouth 2 (Two) Times a Day.     Dispense:  180 tablet     Refill:  3     appt needed    dilTIAZem XR (DILACOR XR) 120 MG 24 hr capsule     Sig: Take 1 capsule by mouth Daily.     Dispense:  90 capsule     Refill:  3           This document has been electronically signed by Brandon Ochoa Jr., APRN  November 15, 2023 12:10 EST

## 2024-04-26 ENCOUNTER — OFFICE VISIT (OUTPATIENT)
Dept: CARDIOLOGY | Facility: CLINIC | Age: 69
End: 2024-04-26
Payer: COMMERCIAL

## 2024-04-26 DIAGNOSIS — I49.5 SICK SINUS SYNDROME: Primary | ICD-10-CM

## 2024-05-15 ENCOUNTER — OFFICE VISIT (OUTPATIENT)
Dept: CARDIOLOGY | Facility: CLINIC | Age: 69
End: 2024-05-15
Payer: COMMERCIAL

## 2024-05-15 VITALS
OXYGEN SATURATION: 97 % | HEIGHT: 72 IN | WEIGHT: 201.6 LBS | DIASTOLIC BLOOD PRESSURE: 76 MMHG | BODY MASS INDEX: 27.3 KG/M2 | HEART RATE: 60 BPM | SYSTOLIC BLOOD PRESSURE: 130 MMHG

## 2024-05-15 DIAGNOSIS — I49.5 SICK SINUS SYNDROME: ICD-10-CM

## 2024-05-15 DIAGNOSIS — I10 PRIMARY HYPERTENSION: ICD-10-CM

## 2024-05-15 DIAGNOSIS — I48.0 PAROXYSMAL ATRIAL FIBRILLATION: Primary | ICD-10-CM

## 2024-05-15 DIAGNOSIS — Z95.0 PRESENCE OF CARDIAC PACEMAKER: ICD-10-CM

## 2024-05-15 PROCEDURE — 93000 ELECTROCARDIOGRAM COMPLETE: CPT | Performed by: NURSE PRACTITIONER

## 2024-05-15 PROCEDURE — 99214 OFFICE O/P EST MOD 30 MIN: CPT | Performed by: NURSE PRACTITIONER

## 2024-05-15 RX ORDER — DILTIAZEM HYDROCHLORIDE 180 MG/1
180 CAPSULE, EXTENDED RELEASE ORAL DAILY
Qty: 90 CAPSULE | Refills: 3 | Status: SHIPPED | OUTPATIENT
Start: 2024-05-15

## 2024-05-15 NOTE — PROGRESS NOTES
Subjective     Jonah Nuñez is a 68 y.o. male who presents to day for 6 month follow up  and Atrial Fibrillation.    CHIEF COMPLIANT  Chief Complaint   Patient presents with    6 month follow up     Atrial Fibrillation       Active Problems:  Problem List Items Addressed This Visit          Cardiac and Vasculature    Atrial fibrillation - Primary    Relevant Medications    dilTIAZem XR (DILACOR XR) 180 MG 24 hr capsule    Other Relevant Orders    ECG 12 Lead    Adult Transthoracic Echo Complete W/ Cont if Necessary Per Protocol     Other Visit Diagnoses       Primary hypertension        Relevant Medications    dilTIAZem XR (DILACOR XR) 180 MG 24 hr capsule    Other Relevant Orders    ECG 12 Lead    Adult Transthoracic Echo Complete W/ Cont if Necessary Per Protocol    Sick sinus syndrome        Relevant Medications    dilTIAZem XR (DILACOR XR) 180 MG 24 hr capsule    Other Relevant Orders    ECG 12 Lead    Adult Transthoracic Echo Complete W/ Cont if Necessary Per Protocol    Presence of cardiac pacemaker        Relevant Orders    ECG 12 Lead    Adult Transthoracic Echo Complete W/ Cont if Necessary Per Protocol        Problem list  1.  Paroxysmal atrial fibrillation currently on flecainide therapy  2.  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  Preserved systolic function  4.  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    Patient does have paroxysmal atrial fibrillation in which she is on antiplatelet therapy of aspirin rate control diltiazem and metoprolol and flecainide for antiarrhythmic therapy.He has been not been able to do DOAC therapy due to the fact that he is on Dilantin and there is a contraindication as well.     Patient also  has chronic arterial hypertension in which she is on metoprolol and diltiazem. 130/76, hr 60.    Patient does have a pacemaker in place.  We did review the most latest interrogation in February 2024.  25% atrial fibrillation burden noted on latest device interrogation.  He has 5 years of battery longevity.  To note patient did have 10 episodes of nonsustained ventricular tachycardia with the longest lasting 21 minutes.  All A-fib with RVR on interrogation though.  We did review his most recent interrogation that was done in the office on 4/26/2024: They identified that 5 years of battery life 24% mode switches 60 episodes of nonsustained VT which was felt to be more atrial fibrillation with RVR.  He is on flecainide for antiarrhythmic therapy we did discuss switching this to another antiarrhythmic to see if we can be more beneficial.  However he is not interested in trying amiodarone or derivatives of amiodarone.  Will continue the flecainide at this time we will increase his diltiazem though.    Patient does report some shortness of breath that mainly occurs with palpitations.  He says that he is also developed a new cough in which she thinks that he may be holding onto a little bit more fluid.  However he denies any orthopnea.  He does report fatigue where he gets tired easily.  He says if he does something strenuous he does not really get short of breath he just wears out.  PRIOR MEDS  Current Outpatient Medications on File Prior to Visit   Medication Sig Dispense Refill    amitriptyline (ELAVIL) 25 MG tablet Take 1 tablet by mouth Every Night.      aspirin 81 MG tablet Take 1 tablet by mouth.      flecainide (TAMBOCOR) 100 MG tablet Take 1 tablet by mouth 2 (Two) Times a Day. 180 tablet 3    metoprolol succinate XL (Toprol XL) 100 MG 24 hr tablet Take 1 tablet by mouth Daily. (Patient taking differently: Take 1 tablet by mouth Every Night.) 90 tablet 3    phenytoin (DILANTIN) 100 MG ER capsule Take 4 capsules by  mouth Daily. Takes differently at times      polyethylene glycol (MIRALAX) packet Take 17 g by mouth Daily.      [DISCONTINUED] dilTIAZem XR (DILACOR XR) 120 MG 24 hr capsule Take 1 capsule by mouth Daily. 90 capsule 3     No current facility-administered medications on file prior to visit.       ALLERGIES  Doxycycline    HISTORY  Past Medical History:   Diagnosis Date    Aneurysm     Atrial fibrillation     Hyperlipidemia     Iliac artery aneurysm        Social History     Socioeconomic History    Marital status:    Tobacco Use    Smoking status: Some Days     Current packs/day: 0.25     Types: Cigarettes    Smokeless tobacco: Former    Tobacco comments:     smokes approx. < 1/2 PPD for approx. 30 yrs.   Substance and Sexual Activity    Alcohol use: Yes     Comment: occas. beer    Drug use: Never    Sexual activity: Defer       Family History   Problem Relation Age of Onset    Lung cancer Mother     Osteoporosis Mother     Heart attack Father     Coronary artery disease Father        Review of Systems   Constitutional:  Positive for fatigue. Negative for chills and fever.   HENT:  Negative for congestion, rhinorrhea and sore throat.    Eyes:  Negative for visual disturbance.   Respiratory:  Positive for shortness of breath (with A Fib). Negative for apnea and chest tightness.    Cardiovascular:  Positive for palpitations. Negative for chest pain and leg swelling.   Gastrointestinal:  Positive for constipation. Negative for diarrhea and nausea.   Musculoskeletal:  Positive for arthralgias. Negative for back pain and neck pain.   Allergic/Immunologic: Positive for environmental allergies. Negative for food allergies.   Neurological:  Positive for weakness. Negative for dizziness, syncope and light-headedness.   Hematological:  Bruises/bleeds easily.   Psychiatric/Behavioral:  Negative for sleep disturbance.        Objective     VITALS: /76 (BP Location: Left arm, Patient Position: Sitting, Cuff Size:  "Adult)   Pulse 60   Ht 182.9 cm (72.01\")   Wt 91.4 kg (201 lb 9.6 oz)   SpO2 97%   BMI 27.34 kg/m²     LABS:   Lab Results (most recent)       None            IMAGING:   No Images in the past 120 days found..    EXAM:  Physical Exam  Vitals and nursing note reviewed.   Constitutional:       Appearance: He is well-developed.   HENT:      Head: Normocephalic.   Neck:      Thyroid: No thyroid mass.      Vascular: No carotid bruit or JVD.      Trachea: Trachea and phonation normal.   Cardiovascular:      Rate and Rhythm: Normal rate and regular rhythm.      Pulses:           Radial pulses are 2+ on the right side and 2+ on the left side.        Posterior tibial pulses are 2+ on the right side and 2+ on the left side.      Heart sounds: Normal heart sounds. No murmur heard.     No friction rub. No gallop.   Pulmonary:      Effort: Pulmonary effort is normal. No respiratory distress.      Breath sounds: Normal breath sounds. No wheezing or rales.   Musculoskeletal:         General: No swelling. Normal range of motion.      Cervical back: Neck supple.   Skin:     General: Skin is warm and dry.      Capillary Refill: Capillary refill takes less than 2 seconds.      Findings: No rash.   Neurological:      Mental Status: He is alert and oriented to person, place, and time.   Psychiatric:         Speech: Speech normal.         Behavior: Behavior normal.         Thought Content: Thought content normal.         Judgment: Judgment normal.         Procedure     ECG 12 Lead    Date/Time: 5/15/2024 11:58 AM  Performed by: Brandon Ochoa APRN    Authorized by: Brandon Ochoa APRN  Comparison: compared with previous ECG from 11/15/2023  Similar to previous ECG  Rhythm: paced  Rate: normal  BPM: 73  QRS axis: normal  Comments: Atrially paced  No acute changes  QTc 451 ms             Assessment & Plan    Diagnosis Plan   1. Paroxysmal atrial fibrillation  ECG 12 Lead    Adult Transthoracic Echo Complete W/ Cont if Necessary Per " Protocol      2. Primary hypertension  ECG 12 Lead    Adult Transthoracic Echo Complete W/ Cont if Necessary Per Protocol      3. Sick sinus syndrome  ECG 12 Lead    Adult Transthoracic Echo Complete W/ Cont if Necessary Per Protocol      4. Presence of cardiac pacemaker  ECG 12 Lead    Adult Transthoracic Echo Complete W/ Cont if Necessary Per Protocol      1.  Patient does have a 25% atrial fibrillation burden per most recent pacemaker interrogation.  We did discuss switching antiarrhythmics which we will stick with flecainide now.  He does not want to try amiodarone.  We will increase his diltiazem to 180 mg daily to see if we can help better control his atrial fibrillation.  2.  Patient's blood pressure is controlled on current blood pressure medication regimen.  No medication changes are warranted at this time.  Patient advised to monitor blood pressure on a daily basis and report any persistent highs or lows.  Set goal blood pressure for patient at 130/80 or below.  3.  Due to patient's increasing fatigue and atrial fibrillation burden as well as his cough in which she is worried about congestive heart failure we will repeat his echocardiogram for further evaluation.  4.  Informed of signs and symptoms of ACS and advised to seek emergent treatment for any new worsening symptoms.  Patient also advised sooner follow-up as needed.  Also advised to follow-up with family doctor as needed  This note is dictated utilizing voice recognition software.  Although this record has been proof read, transcriptional errors may still be present. If questions occur regarding the content of this record please do not hesitate to call our office.  I have reviewed and confirmed the accuracy of the ROS as documented by the MA/MYRA/RN MUMTAZ Morgan    Return if symptoms worsen or fail to improve, for Next scheduled follow up.    Diagnoses and all orders for this visit:    1. Paroxysmal atrial fibrillation (Primary)  -     ECG  12 Lead  -     Adult Transthoracic Echo Complete W/ Cont if Necessary Per Protocol; Future    2. Primary hypertension  -     ECG 12 Lead  -     Adult Transthoracic Echo Complete W/ Cont if Necessary Per Protocol; Future    3. Sick sinus syndrome  -     ECG 12 Lead  -     Adult Transthoracic Echo Complete W/ Cont if Necessary Per Protocol; Future    4. Presence of cardiac pacemaker  -     ECG 12 Lead  -     Adult Transthoracic Echo Complete W/ Cont if Necessary Per Protocol; Future    Other orders  -     dilTIAZem XR (DILACOR XR) 180 MG 24 hr capsule; Take 1 capsule by mouth Daily.  Dispense: 90 capsule; Refill: 3        Jonah Nuñez  reports that he has been smoking cigarettes. He has quit using smokeless tobacco. I have educated him on the risk of diseases from using tobacco products such as cancer, COPD, and heart disease.     I advised him to quit and he is willing to quit.Patient has cut back on smoking.   I spent 5 minutes counseling the patient.           BMI is >= 25 and <30. (Overweight) The following options were offered after discussion;: weight loss educational material (shared in after visit summary) and exercise counseling/recommendations           MEDS ORDERED DURING VISIT:  New Medications Ordered This Visit   Medications    dilTIAZem XR (DILACOR XR) 180 MG 24 hr capsule     Sig: Take 1 capsule by mouth Daily.     Dispense:  90 capsule     Refill:  3           This document has been electronically signed by Brandon Ochoa Jr., APRN  May 15, 2024 13:00 EDT

## 2024-05-30 ENCOUNTER — HOSPITAL ENCOUNTER (OUTPATIENT)
Dept: CARDIOLOGY | Facility: HOSPITAL | Age: 69
Discharge: HOME | End: 2024-05-30
Payer: COMMERCIAL

## 2024-05-30 DIAGNOSIS — I49.5 SICK SINUS SYNDROME: ICD-10-CM

## 2024-05-30 DIAGNOSIS — I48.0 PAROXYSMAL ATRIAL FIBRILLATION: ICD-10-CM

## 2024-05-30 DIAGNOSIS — I10 PRIMARY HYPERTENSION: ICD-10-CM

## 2024-05-30 DIAGNOSIS — Z95.0 PRESENCE OF CARDIAC PACEMAKER: ICD-10-CM

## 2024-05-30 PROCEDURE — 93306 TTE W/DOPPLER COMPLETE: CPT

## 2024-06-02 LAB
AORTIC DIMENSIONLESS INDEX: 0.8 (DI)
BH CV ECHO MEAS - ACS: 2.1 CM
BH CV ECHO MEAS - AO MAX PG: 4.5 MMHG
BH CV ECHO MEAS - AO MEAN PG: 2 MMHG
BH CV ECHO MEAS - AO ROOT DIAM: 4 CM
BH CV ECHO MEAS - AO V2 MAX: 106 CM/SEC
BH CV ECHO MEAS - AO V2 VTI: 25.8 CM
BH CV ECHO MEAS - AVA(I,D): 2.37 CM2
BH CV ECHO MEAS - EDV(CUBED): 61.6 ML
BH CV ECHO MEAS - EDV(MOD-SP4): 86.4 ML
BH CV ECHO MEAS - EF(MOD-SP4): 56.6 %
BH CV ECHO MEAS - ESV(CUBED): 20.3 ML
BH CV ECHO MEAS - ESV(MOD-SP4): 37.5 ML
BH CV ECHO MEAS - FS: 30.9 %
BH CV ECHO MEAS - IVS/LVPW: 0.96 CM
BH CV ECHO MEAS - IVSD: 1.3 CM
BH CV ECHO MEAS - LA A2CS (ATRIAL LENGTH): 5.3 CM
BH CV ECHO MEAS - LA A4C LENGTH: 5.1 CM
BH CV ECHO MEAS - LA DIMENSION: 3.3 CM
BH CV ECHO MEAS - LAT PEAK E' VEL: 8.1 CM/SEC
BH CV ECHO MEAS - LV DIASTOLIC VOL/BSA (35-75): 7.6 CM2
BH CV ECHO MEAS - LV MASS(C)D: 189.6 GRAMS
BH CV ECHO MEAS - LV MAX PG: 3.1 MMHG
BH CV ECHO MEAS - LV MEAN PG: 2 MMHG
BH CV ECHO MEAS - LV SYSTOLIC VOL/BSA (12-30): 3.3 CM2
BH CV ECHO MEAS - LV V1 MAX: 88.4 CM/SEC
BH CV ECHO MEAS - LV V1 VTI: 19.5 CM
BH CV ECHO MEAS - LVIDD: 4 CM
BH CV ECHO MEAS - LVIDS: 2.7 CM
BH CV ECHO MEAS - LVOT AREA: 3.1 CM2
BH CV ECHO MEAS - LVOT DIAM: 2 CM
BH CV ECHO MEAS - LVPWD: 1.36 CM
BH CV ECHO MEAS - MED PEAK E' VEL: 8.2 CM/SEC
BH CV ECHO MEAS - MV A MAX VEL: 83.2 CM/SEC
BH CV ECHO MEAS - MV DEC SLOPE: 420 CM/SEC2
BH CV ECHO MEAS - MV DEC TIME: 0.22 SEC
BH CV ECHO MEAS - MV E MAX VEL: 97.7 CM/SEC
BH CV ECHO MEAS - MV E/A: 1.17
BH CV ECHO MEAS - MV MAX PG: 5.8 MMHG
BH CV ECHO MEAS - MV MEAN PG: 2 MMHG
BH CV ECHO MEAS - MV P1/2T: 84.4 MSEC
BH CV ECHO MEAS - MV V2 VTI: 40.2 CM
BH CV ECHO MEAS - MVA(P1/2T): 2.6 CM2
BH CV ECHO MEAS - MVA(VTI): 1.52 CM2
BH CV ECHO MEAS - PA V2 MAX: 121 CM/SEC
BH CV ECHO MEAS - RAP SYSTOLE: 10 MMHG
BH CV ECHO MEAS - RV MAX PG: 2.25 MMHG
BH CV ECHO MEAS - RV V1 MAX: 75 CM/SEC
BH CV ECHO MEAS - RV V1 VTI: 21.2 CM
BH CV ECHO MEAS - RVDD: 3.1 CM
BH CV ECHO MEAS - RVSP: 38.7 MMHG
BH CV ECHO MEAS - SV(LVOT): 61.3 ML
BH CV ECHO MEAS - SV(MOD-SP4): 48.9 ML
BH CV ECHO MEAS - SVI(LVOT): 5.4 ML/M2
BH CV ECHO MEAS - SVI(MOD-SP4): 4.3 ML/M2
BH CV ECHO MEAS - TAPSE (>1.6): 2.38 CM
BH CV ECHO MEAS - TR MAX PG: 28.7 MMHG
BH CV ECHO MEAS - TR MAX VEL: 268 CM/SEC
BH CV ECHO MEASUREMENTS AVERAGE E/E' RATIO: 11.99
LEFT ATRIUM VOLUME: 34 ML

## 2024-06-04 ENCOUNTER — TELEPHONE (OUTPATIENT)
Dept: CARDIOLOGY | Facility: CLINIC | Age: 69
End: 2024-06-04
Payer: COMMERCIAL

## 2024-06-04 NOTE — TELEPHONE ENCOUNTER
I called patient and went over Echo results as follows:    There is no acute findings on the echocardiogram.  Keep follow-up.  Patient did have mild pulmonary hypertension.  Will discuss further on follow-up.     Satisfactory

## 2024-06-06 ENCOUNTER — TELEPHONE (OUTPATIENT)
Dept: CARDIOLOGY | Facility: CLINIC | Age: 69
End: 2024-06-06
Payer: COMMERCIAL

## 2024-06-06 NOTE — TELEPHONE ENCOUNTER
In-office interrogation reviewed by  recommendations given and also reviewed by Brandon PANDYA, patient was talked to about this in office at his last visit- he is to keep follow up and current medication is acceptable.

## 2024-08-26 NOTE — PATIENT INSTRUCTIONS
Acute Coronary Syndrome  Acute coronary syndrome (ACS) is a serious problem in which there is suddenly not enough blood and oxygen reaching the heart. ACS can result in chest pain or a heart attack.  This condition is a medical emergency. If you have any symptoms of this condition, get help right away.  What are the causes?  This condition may be caused by:  · A buildup of fat and cholesterol inside the arteries (atherosclerosis). This is the most common cause. The buildup (plaque) can cause blood vessels in the heart (coronary arteries) to become narrow or blocked, which reduces blood flow to the heart. Plaque can also break off and lead to a clot, which can block an artery and cause a heart attack or stroke.  · Sudden tightening of the muscles around the coronary arteries (coronary spasm).  · Tearing of a coronary artery (spontaneous coronary artery dissection).  · Very low blood pressure (hypotension).  · An abnormal heartbeat (arrhythmia).  · Other medical conditions that cause a decrease of oxygen to the heart, such as anemiaorrespiratory failure.  · Using cocaine or methamphetamine.  What increases the risk?  The following factors may make you more likely to develop this condition:  · Age. The risk for ACS increases as you get older.  · History of chest pain, heart attack, peripheral artery disease, or stroke.  · Having taken chemotherapy or immune-suppressing medicines.  · Being male.  · Family history of chest pain, heart disease, or stroke.  · Smoking.  · Not exercising enough.  · Being overweight.  · High cholesterol.  · High blood pressure (hypertension).  · Diabetes.  · Excessive alcohol use.  What are the signs or symptoms?  Common symptoms of this condition include:  · Chest pain. The pain may last a long time, or it may stop and come back (recur). It may feel like:  ? Crushing or squeezing.  ? Tightness, pressure, fullness, or heaviness.  · Arm, neck, jaw, or back pain.  · Heartburn or  HPI: Lianne Moss is a 34 year old  at 39w5d d/b L=10 presents for scheduled primary  section.    Patient has a history of a VA-VD that resulted in a cephalohematoma on baby. Patient declines another operative delivery and would rather have a PCS.    Reports that she feels well, ready to proceed. Denies regular painful contractions, vaginal bleeding, LOF. Reports good FM.    Currently accompanied by partner. Declines intra-op contraception.     12 point ROS negative unless otherwise stated in the HPI    SIGNIFICANT HX:  Patient Active Problem List    Diagnosis Date Noted    Delivery by  section of full-term infant (CMD) 2024     Priority: Low    Fall at home 2024     Priority: Low    LGA (large for gestational age) fetus affecting mother, antepartum (CMD) 2024     Priority: Low     35w Estimated FW: 3598 gm 7lb 15oz > 97 %Tile  HC: 3>7 %Tile  AC:  > 97 %Tile  Cephalic     8#10oz      Abnormal glucose tolerance test 2024     Priority: Low     Failed 1h  3h nl ()      History of genital warts 2024     Priority: Low    Vacuum-assisted vaginal delivery (CMD) 2024     Priority: Low     VAVD c/b 2nd degree perineal tear at 40+1 wga      Class 1 obesity with body mass index (BMI) of 31.0 to 31.9 in adult 2024     Priority: Low     Prepregnancy BMI 31kg/m2     Aspirin [ x ]   Early 1 [ x ] nml      Mild intermittent asthma without complication (CMD) 2024     Priority: Low     Remote h/o asthma      Pregnancy (CMD) 2024     Priority: Low     LOLY: 2024 by LMP c/w 10w US    [ x ] Does not need anesthesia consult  [ x ] Accepts blood  [ x ] Pap nml  [ x ] Carrier screen neg SMA and CF  [ x ] cffdna low risk  [ x ] msafp low risk  [ x ] 1T labs nml  [ x] 3T labs nml  [ X] 1 hr abnml, 3 hr GTT nml   [ x ] GBS neg  [ x ] Anatomy US -  at 20w0d - posterior placenta, 3VC, EFW 78%, AC 72%; [x ] repeat  complete and nml  [ x ] Flu  [  indigestion.  · Shortness of breath.  · Nausea.  · Sudden cold sweats.  · Light-headedness.  · Dizziness or passing out.  · Tiredness (fatigue).  Sometimes there are no symptoms.  How is this diagnosed?  This condition may be diagnosed based on:  · Your medical history and symptoms.  · Imaging tests, such as:  ? An electrocardiogram (ECG). This measures the heart's electrical activity.  ? X-rays.  ? CT scan.  ? A coronary angiogram. For this test, dye is injected into the heart arteries and then X-rays are taken.  ? Myocardial perfusion imaging. This test shows how well blood flows through your heart muscle.  · Blood tests. These may be repeated at certain time intervals.  · Exercise stress testing.  · Echocardiogram. This is a test that uses sound waves to produce detailed images of the heart.  How is this treated?  Treatment for this condition may include:  · Oxygen therapy.  · Medicines, such as:  ? Antiplatelet medicines and blood-thinning medicines, such as aspirin. These help prevent blood clots.  ? Medicine that dissolves any blood clots (fibrinolytic therapy).  ? Blood pressure medicines.  ? Nitroglycerin. This helps widen blood vessels to improve blood flow.  ? Pain medicine.  ? Cholesterol-lowering medicine.  · Surgery, such as:  ? Coronary angioplasty with stent placement. This involves placing a small piece of metal that looks like mesh or a spring into a narrow coronary artery. This widens the artery and keeps it open.  ? Coronary artery bypass surgery. This involves taking a section of a blood vessel from a different part of your body and placing it on the blocked coronary artery to allow blood to flow around the blockage.  · Cardiac rehabilitation. This is a program that includes exercise training, education, and counseling to help you recover.  Follow these instructions at home:  Eating and drinking  · Eat a heart-healthy diet that includes whole grains, fruits and vegetables, lean proteins, and  x ] Tdap   [ x ] Feeding: breast  [  ] Contraception    Pt worried about  due to cephalohematoma from VAVD last time, declines operative delivery, would rather have PCS. Requesting PCS, R/B/A discussed today, pt to consider.     C/s scheduled 2024; nothing available any sooner, is on wait list.      History of pre-eclampsia in prior pregnancy, currently pregnant (CMD) 2024     Priority: Low     Baseline labs nml  ASA ppx       Prenatal labs:  Blood type: B+  Antibody: negative  Rubella: immune  Hep BsAg: negative   RPR: NR  HIV: NR  GBS: negative    OB hx:   OB History    Para Term  AB Living   2 1 1     1   SAB IAB Ectopic Molar Multiple Live Births           0 1      # Outcome Date GA Lbr Peña/2nd Weight Sex Type Anes PTL Lv   2 Current            1 Term 11/10/22 40w1d / 01:33 3920 g (8 lb 10.3 oz) F Vag-Spont EPI N ANNE     Gyn hx:   Noncontributory    Past Medical History:   Diagnosis Date    Abnormal Pap smear of vagina     Asthma (CMD)     Flank pain in pregnant patient (CMD) 2022    Genital warts     Kidney stones     Postpartum depression     Pregnant and not yet delivered in third trimester (CMD) 2022    RAD (reactive airway disease) (CMD)     exercise induced     Past Surgical History:   Procedure Laterality Date    Kidney stone surgery      Tooth extraction  2007    Vaginal delivery        Family History   Problem Relation Age of Onset    Hypothyroid Mother     Hypertension Father     Hypothyroid Father     Patient is unaware of any medical problems Sister     Patient is unaware of any medical problems Sister     Patient is unaware of any medical problems Brother     Patient is unaware of any medical problems Brother       Social hx:   Social History     Tobacco Use    Smoking status: Former     Current packs/day: 0.00     Types: Cigarettes     Quit date:      Years since quittin.6    Smokeless tobacco: Never   Vaping Use    Vaping status: never used    Substance Use Topics    Alcohol use: Not Currently    Drug use: Not Currently     Meds   Current Outpatient Medications   Medication Instructions    albuterol (PROAIR HFA) 108 (90 Base) MCG/ACT inhaler 2 puffs, Inhalation, EVERY 4 HOURS PRN    aspirin 81 mg, Oral, DAILY    Prenatal Vit-Fe Fumarate-FA (PRENATAL VITAMINS PO) Oral    Probiotic Product (PROBIOTIC BLEND PO) Oral      All: ALLERGIES:  No Known Allergies     Physical:  Vitals:    24 0745   BP: 119/71     Gen: no acute distress, alert, resting comfortably  CV: RRR  Pulm: no increased work of breathing  Abdomen: soft, gravid, no TTP  Ext: no calf tenderness w/ palpation, trace edema    FHT: baseline 135, moderate variability, + accels, - decels  [place]: quiet    Recent Labs   Lab 24  1004   WBC 12.2*   RBC 4.07   HGB 11.9*   HCT 36.5        No results found    Imaging:  - TIBURCIO: fetus cephalic, placenta posterior, MVP 5.97 cm    Impression and plan:  Lianne Moss is a 34 year old  at 39w5d d/b L=10 presents for scheduled primary  section.    - Admit to L&D  - NPO, IVF  - STAT CBC, T&S  - FWB: NICHD Cat I  - CEFM/TOCO  - Informed consent obtained. R/B/A discussed including risk for but not limited to:  bleeding, infection, damage to surrounding structures including but not limited to ureter, bowel, fallopian tubes, ovaries, nerves, and blood vessels, and possible damage to fetus discussed. Also the need for possible blood transfusion in the event of hemorrhage discussed with the patient.  Patient's questions answered.   - Ancef 2gm to OR  - SCDs to OR   - anesthesia to see    Discussed w/ Dr. Bethany Dozier MD PGY3  [facility]ne[facility]   Service Phone:    low-fat or nonfat dairy products.  · Limit how much salt (sodium) you eat as told by your health care provider. Follow instructions from your health care provider about any other eating or drinking restrictions, such as limiting foods that are high in fat and processed sugars.  · Use healthy cooking methods such as roasting, grilling, broiling, baking, poaching, steaming, or stir-frying.  · Work with a dietitian to follow a heart-healthy eating plan.  Medicines  · Take over-the-counter and prescription medicines only as told by your health care provider.  · Do not take these medicines unless your health care provider approves:  ? Vitamin supplements that contain vitamin A or vitamin E.  ? NSAIDs, such as ibuprofen, naproxen, or celecoxib.  ? Hormone replacement therapy that contains estrogen.  · If you are taking blood thinners:  ? Talk with your health care provider before you take any medicines that contain aspirin or NSAIDs. These medicines increase your risk for dangerous bleeding.  ? Take your medicine exactly as told, at the same time every day.  ? Avoid activities that could cause injury or bruising, and follow instructions about how to prevent falls.  ? Wear a medical alert bracelet, and carry a card that lists what medicines you take.  Activity  · Follow your cardiac rehabilitation program. Do exercises as told by your physical therapist.  · Ask your health care provider what activities and exercises are safe for you. Follow his or her instructions about lifting, driving, or climbing stairs.  Lifestyle  · Do not use any products that contain nicotine or tobacco, such as cigarettes, e-cigarettes, and chewing tobacco. If you need help quitting, ask your health care provider.  · Do not drink alcohol if your health care provider tells you not to drink.  · If you drink alcohol:  ? Limit how much you have to 0-1 drink a day.  ? Be aware of how much alcohol is in your drink. In the [place] one drink equals one 12 oz  bottle of beer (355 mL), one 5 oz glass of wine (148 mL), or one 1½ oz glass of hard liquor (44 mL).  · Maintain a healthy weight. If you need to lose weight, work with your health care provider to do so safely.  General instructions  · Tell all the health care providers who provide care for you about your heart condition, including your dentist. This may affect the medicines or treatment you receive.  · Manage any other health conditions you have, such as hypertension or diabetes. These conditions affect your heart.  · Pay attention to your mental health. You may be at higher risk for depression.  ? Find ways to manage stress.  ? Talk to your health care provider about depression screening and treatment.  · Keep your vaccinations up to date.  ? Get the flu shot (influenza vaccine) every year.  ? Get the pneumococcal vaccine if you are age 65 or older.  · If directed, monitor your blood pressure at home.  · Keep all follow-up visits as told by your health care provider. This is important.  Contact a health care provider if you:  · Feel overwhelmed or sad.  · Have trouble doing your daily activities.  Get help right away if you:  · Have pain in your chest, neck, arm, jaw, stomach, or back that recurs, and:  ? It lasts for more than a few minutes.  ? It is not relieved by taking the medicineyour health care provider prescribed.  · Have unexplained:  ? Heavy sweating.  ? Heartburn or indigestion.  ? Nausea or vomiting.  ? Shortness of breath.  ? Difficulty breathing.  ? Fatigue.  ? Nervousness or anxiety.  ? Weakness.  ? Diarrhea.  ? Dark stools or blood in your stool.  · Have sudden light-headedness or dizziness.  · Have blood pressure that is higher than 180/120.  · Faint.  · Have thoughts about hurting yourself.  These symptoms may represent a serious problem that is an emergency. Do not wait to see if the symptoms will go away. Get medical help right away. Call your local emergency services (911 in the [place] Do  not drive yourself to the hospital.   Summary  · Acute coronary syndrome (ACS) is when there is not enough blood and oxygen being supplied to the heart. ACS can result in chest pain or a heart attack.  · Acute coronary syndrome is a medical emergency. If you have any symptoms of this condition, get help right away.  · Treatment includes medicines and procedures to open the blocked arteries and restore blood flow.  This information is not intended to replace advice given to you by your health care provider. Make sure you discuss any questions you have with your health care provider.  Document Revised: 05/20/2020 Document Reviewed: 12/30/2019  "[employer] Patient Education © 2021 "[employer] Inc.      Atrial Fibrillation    Atrial fibrillation is a type of heartbeat that is irregular or fast. If you have this condition, your heart beats without any order. This makes it hard for your heart to pump blood in a normal way.  Atrial fibrillation may come and go, or it may become a long-lasting problem. If this condition is not treated, it can put you at higher risk for stroke, heart failure, and other heart problems.  What are the causes?  This condition may be caused by diseases that damage the heart. They include:  · High blood pressure.  · Heart failure.  · Heart valve disease.  · Heart surgery.  Other causes include:  · Diabetes.  · Thyroid disease.  · Being overweight.  · Kidney disease.  Sometimes the cause is not known.  What increases the risk?  You are more likely to develop this condition if:  · You are older.  · You smoke.  · You exercise often and very hard.  · You have a family history of this condition.  · You are a man.  · You use drugs.  · You drink a lot of alcohol.  · You have lung conditions, such as emphysema, pneumonia, or COPD.  · You have sleep apnea.  What are the signs or symptoms?  Common symptoms of this condition include:  · A feeling that your heart is beating very fast.  · Chest pain or  discomfort.  · Feeling short of breath.  · Suddenly feeling light-headed or weak.  · Getting tired easily during activity.  · Fainting.  · Sweating.  In some cases, there are no symptoms.  How is this treated?  Treatment for this condition depends on underlying conditions and how you feel when you have atrial fibrillation. They include:  · Medicines to:  ? Prevent blood clots.  ? Treat heart rate or heart rhythm problems.  · Using devices, such as a pacemaker, to correct heart rhythm problems.  · Doing surgery to remove the part of the heart that sends bad signals.  · Closing an area where clots can form in the heart (left atrial appendage).  In some cases, your doctor will treat other underlying conditions.  Follow these instructions at home:  Medicines  · Take over-the-counter and prescription medicines only as told by your doctor.  · Do not take any new medicines without first talking to your doctor.  · If you are taking blood thinners:  ? Talk with your doctor before you take any medicines that have aspirin or NSAIDs, such as ibuprofen, in them.  ? Take your medicine exactly as told by your doctor. Take it at the same time each day.  ? Avoid activities that could hurt or bruise you. Follow instructions about how to prevent falls.  ? Wear a bracelet that says you are taking blood thinners. Or, carry a card that lists what medicines you take.  Lifestyle         · Do not use any products that have nicotine or tobacco in them. These include cigarettes, e-cigarettes, and chewing tobacco. If you need help quitting, ask your doctor.  · Eat heart-healthy foods. Talk with your doctor about the right eating plan for you.  · Exercise regularly as told by your doctor.  · Do not drink alcohol.  · Lose weight if you are overweight.  · Do not use drugs, including cannabis.  General instructions  · If you have a condition that causes breathing to stop for a short period of time (apnea), treat it as told by your doctor.  · Keep  "a healthy weight. Do not use diet pills unless your doctor says they are safe for you. Diet pills may make heart problems worse.  · Keep all follow-up visits as told by your doctor. This is important.  Contact a doctor if:  · You notice a change in the speed, rhythm, or strength of your heartbeat.  · You are taking a blood-thinning medicine and you get more bruising.  · You get tired more easily when you move or exercise.  · You have a sudden change in weight.  Get help right away if:    · You have pain in your chest or your belly (abdomen).  · You have trouble breathing.  · You have side effects of blood thinners, such as blood in your vomit, poop (stool), or pee (urine), or bleeding that cannot stop.  · You have any signs of a stroke. \"BE FAST\" is an easy way to remember the main warning signs:  ? B - Balance. Signs are dizziness, sudden trouble walking, or loss of balance.  ? E - Eyes. Signs are trouble seeing or a change in how you see.  ? F - Face. Signs are sudden weakness or loss of feeling in the face, or the face or eyelid drooping on one side.  ? A - Arms. Signs are weakness or loss of feeling in an arm. This happens suddenly and usually on one side of the body.  ? S - Speech. Signs are sudden trouble speaking, slurred speech, or trouble understanding what people say.  ? T - Time. Time to call emergency services. Write down what time symptoms started.  · You have other signs of a stroke, such as:  ? A sudden, very bad headache with no known cause.  ? Feeling like you may vomit (nausea).  ? Vomiting.  ? A seizure.  These symptoms may be an emergency. Do not wait to see if the symptoms will go away. Get medical help right away. Call your local emergency services (911 in the [place] Do not drive yourself to the hospital.  Summary  · Atrial fibrillation is a type of heartbeat that is irregular or fast.  · You are at higher risk of this condition if you smoke, are older, have diabetes, or are " overweight.  · Follow your doctor's instructions about medicines, diet, exercise, and follow-up visits.  · Get help right away if you have signs or symptoms of a stroke.  · Get help right away if you cannot catch your breath, or you have chest pain or discomfort.  This information is not intended to replace advice given to you by your health care provider. Make sure you discuss any questions you have with your health care provider.  Document Revised: 06/10/2020 Document Reviewed: 06/10/2020  [employer] Patient Education © 2021 [employer]

## 2024-11-10 DIAGNOSIS — R00.2 PALPITATIONS: ICD-10-CM

## 2024-11-10 DIAGNOSIS — I48.91 ATRIAL FIBRILLATION, UNSPECIFIED TYPE: ICD-10-CM

## 2024-11-11 RX ORDER — METOPROLOL SUCCINATE 100 MG/1
100 TABLET, EXTENDED RELEASE ORAL DAILY
Qty: 90 TABLET | Refills: 3 | Status: SHIPPED | OUTPATIENT
Start: 2024-11-11

## 2024-11-18 ENCOUNTER — OFFICE VISIT (OUTPATIENT)
Dept: CARDIOLOGY | Facility: CLINIC | Age: 69
End: 2024-11-18
Payer: COMMERCIAL

## 2024-11-18 VITALS
OXYGEN SATURATION: 95 % | HEART RATE: 63 BPM | DIASTOLIC BLOOD PRESSURE: 80 MMHG | BODY MASS INDEX: 26.74 KG/M2 | WEIGHT: 197.4 LBS | SYSTOLIC BLOOD PRESSURE: 144 MMHG | HEIGHT: 72 IN

## 2024-11-18 DIAGNOSIS — I48.0 PAROXYSMAL ATRIAL FIBRILLATION: Primary | ICD-10-CM

## 2024-11-18 DIAGNOSIS — I48.91 ATRIAL FIBRILLATION WITH RVR: ICD-10-CM

## 2024-11-18 DIAGNOSIS — R00.2 PALPITATIONS: ICD-10-CM

## 2024-11-18 DIAGNOSIS — I10 PRIMARY HYPERTENSION: ICD-10-CM

## 2024-11-18 PROCEDURE — 99213 OFFICE O/P EST LOW 20 MIN: CPT | Performed by: NURSE PRACTITIONER

## 2024-11-18 PROCEDURE — 93000 ELECTROCARDIOGRAM COMPLETE: CPT | Performed by: NURSE PRACTITIONER

## 2024-11-18 RX ORDER — FLECAINIDE ACETATE 100 MG/1
100 TABLET ORAL 2 TIMES DAILY
Qty: 180 TABLET | Refills: 3 | Status: SHIPPED | OUTPATIENT
Start: 2024-11-18

## 2024-11-18 NOTE — PROGRESS NOTES
Subjective     Jonah Nuñez is a 69 y.o. male who presents to day for 6 month follow up  and Atrial Fibrillation.    CHIEF COMPLIANT  Chief Complaint   Patient presents with    6 month follow up     Atrial Fibrillation       Active Problems:  Problem List Items Addressed This Visit          Cardiac and Vasculature    Atrial fibrillation - Primary    Relevant Medications    flecainide (TAMBOCOR) 100 MG tablet    Other Relevant Orders    ECG 12 Lead    Palpitations    Relevant Orders    ECG 12 Lead     Other Visit Diagnoses       Primary hypertension        Relevant Orders    ECG 12 Lead    Atrial fibrillation with RVR        Relevant Medications    flecainide (TAMBOCOR) 100 MG tablet          Problem list  1.  Paroxysmal atrial fibrillation currently on flecainide therapy  1.1 echocardiogram 5/24: EF greater than or equal to 50%, grade 1A diastolic dysfunction, trivial MR and mild TR, aortic root dilated at 4 cm, RVSP in the high 30s  2.  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  Preserved systolic function  4.  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     Patient does have paroxysmal atrial fibrillation in which she is on antiplatelet therapy of aspirin rate control diltiazem and metoprolol and flecainide for antiarrhythmic therapy. He has been not been able to do DOAC therapy due to the fact that he is on Dilantin and there is a contraindication as well.  Patient reports that he has rarely ever has any palpitations.  He said he does not really notice them anymore.     Patient also has chronic arterial hypertension in which she is on metoprolol and diltiazem.  Today's blood pressure is 144/80 and HR 63.    Patient does have a pacemaker  in place.  We did review the most latest interrogation in 8/19/2024.  Patient's atrial fibrillation burden had decreased to 14%.  Had 5 years of battery life.  Afib predominantly occurred during hours of sleep.    Patient did recently go under an echocardiogram that showed an EF greater than or equal to 50%, grade 1A diastolic dysfunction, trivial MR and mild TR.  Aortic root was dilated to 4 cm with a right ventricular systolic pressure in the high 30s.  We did discuss this in detail..  PRIOR MEDS  Current Outpatient Medications on File Prior to Visit   Medication Sig Dispense Refill    amitriptyline (ELAVIL) 25 MG tablet Take 1 tablet by mouth Every Night.      aspirin 81 MG tablet Take 1 tablet by mouth.      dilTIAZem XR (DILACOR XR) 180 MG 24 hr capsule Take 1 capsule by mouth Daily. 90 capsule 3    metoprolol succinate XL (TOPROL-XL) 100 MG 24 hr tablet TAKE 1 TABLET BY MOUTH EVERY DAY 90 tablet 3    phenytoin (DILANTIN) 100 MG ER capsule Take 4 capsules by mouth Daily. Takes differently at times      polyethylene glycol (MIRALAX) packet Take 17 g by mouth Daily.      [DISCONTINUED] flecainide (TAMBOCOR) 100 MG tablet Take 1 tablet by mouth 2 (Two) Times a Day. 180 tablet 3     No current facility-administered medications on file prior to visit.       ALLERGIES  Doxycycline    HISTORY  Past Medical History:   Diagnosis Date    Aneurysm     Atrial fibrillation     COVID 10/2024    Hyperlipidemia     Iliac artery aneurysm        Social History     Socioeconomic History    Marital status:    Tobacco Use    Smoking status: Some Days     Current packs/day: 0.25     Types: Cigarettes    Smokeless tobacco: Former    Tobacco comments:     smokes approx. < 1/2 PPD for approx. 30 yrs.   Substance and Sexual Activity    Alcohol use: Yes     Comment: occas. beer    Drug use: Never    Sexual activity: Defer       Family History   Problem Relation Age of Onset    Lung cancer Mother     Osteoporosis Mother     Heart  "attack Father     Coronary artery disease Father        Review of Systems   Constitutional:  Positive for fatigue. Negative for chills and fever.   HENT:  Positive for congestion and sinus pressure. Negative for rhinorrhea, sinus pain and sore throat.    Eyes:  Negative for visual disturbance.   Respiratory:  Negative for apnea, chest tightness and shortness of breath.    Cardiovascular:  Positive for palpitations. Negative for chest pain and leg swelling.   Gastrointestinal:  Positive for constipation. Negative for diarrhea and nausea.   Musculoskeletal:  Positive for arthralgias. Negative for back pain and neck pain.   Skin:  Negative for rash and wound.   Allergic/Immunologic: Positive for environmental allergies. Negative for food allergies.   Neurological:  Negative for dizziness, syncope, weakness and light-headedness.   Hematological:  Bruises/bleeds easily.   Psychiatric/Behavioral:  Negative for sleep disturbance.        Objective     VITALS: /80 (BP Location: Right arm, Patient Position: Sitting, Cuff Size: Adult)   Pulse 63   Ht 182.9 cm (72.01\")   Wt 89.5 kg (197 lb 6.4 oz)   SpO2 95%   BMI 26.77 kg/m²     LABS:   Lab Results (most recent)       None            IMAGING:   No Images in the past 120 days found..    EXAM:  Physical Exam  Vitals and nursing note reviewed.   Constitutional:       Appearance: He is well-developed.   HENT:      Head: Normocephalic.   Neck:      Thyroid: No thyroid mass.      Vascular: No carotid bruit or JVD.      Trachea: Trachea and phonation normal.   Cardiovascular:      Rate and Rhythm: Normal rate and regular rhythm.      Pulses:           Radial pulses are 2+ on the right side and 2+ on the left side.        Posterior tibial pulses are 2+ on the right side and 2+ on the left side.      Heart sounds: Normal heart sounds. No murmur heard.     No friction rub. No gallop.   Pulmonary:      Effort: Pulmonary effort is normal. No respiratory distress.      Breath " sounds: Normal breath sounds. No wheezing or rales.   Musculoskeletal:         General: No swelling. Normal range of motion.      Cervical back: Neck supple.   Skin:     General: Skin is warm and dry.      Capillary Refill: Capillary refill takes less than 2 seconds.      Findings: No rash.   Neurological:      Mental Status: He is alert and oriented to person, place, and time.   Psychiatric:         Speech: Speech normal.         Behavior: Behavior normal.         Thought Content: Thought content normal.         Judgment: Judgment normal.         Procedure     ECG 12 Lead    Date/Time: 11/18/2024 1:38 PM  Performed by: Brandon Ochoa APRN    Authorized by: Brandon Ochoa APRN  Comparison: compared with previous ECG from 5/15/2024  Rhythm: paced  Rate: normal  BPM: 74  QRS axis: normal  Comments: Qtc 475 ms  No acute changes             Assessment & Plan    Diagnosis Plan   1. Paroxysmal atrial fibrillation  ECG 12 Lead      2. Palpitations  ECG 12 Lead      3. Primary hypertension  ECG 12 Lead      4. Atrial fibrillation with RVR  flecainide (TAMBOCOR) 100 MG tablet      1.  Patient does have a history of atrial fibrillation which she is on flecainide for antiarrhythmic therapy.  And metoprolol for rate control as well as diltiazem.  He is doing relatively well from the cardiovascular standpoint.  He denies any significant palpitation or failure burden.    2.  Patient's blood pressure is controlled on current blood pressure medication regimen despite elevated blood pressure today.  No medication changes are warranted at this time.  Patient advised to monitor blood pressure on a daily basis and report any persistent highs or lows.  Set goal blood pressure for patient at 130/80 or below.    3.  Overall patient seems to be doing well from a cardiovascular standpoint.  He denies any significant angina anginal equivalent symptoms.  Will continue to follow.    4.  Informed of signs and symptoms of ACS and advised to  seek emergent treatment for any new worsening symptoms.  Patient also advised sooner follow-up as needed.  Also advised to follow-up with family doctor as needed  This note is dictated utilizing voice recognition software.  Although this record has been proof read, transcriptional errors may still be present. If questions occur regarding the content of this record please do not hesitate to call our office.  I have reviewed and confirmed the accuracy of the ROS as documented by the MA/LPN/RN MUMTAZ Morgan    Return in about 6 months (around 5/18/2025), or if symptoms worsen or fail to improve.    Diagnoses and all orders for this visit:    1. Paroxysmal atrial fibrillation (Primary)  -     ECG 12 Lead    2. Palpitations  -     ECG 12 Lead    3. Primary hypertension  -     ECG 12 Lead    4. Atrial fibrillation with RVR  -     flecainide (TAMBOCOR) 100 MG tablet; Take 1 tablet by mouth 2 (Two) Times a Day.  Dispense: 180 tablet; Refill: 3        Jonah Savannah  reports that he has been smoking cigarettes. He has quit using smokeless tobacco. I have educated him on the risk of diseases from using tobacco products such as cancer, COPD, and heart disease. Patient smokes a half a pack a day .    I advised him to quit and he is willing to quit. Patient has tried previously but has not been successful.I spent 5.5 minutes counseling the patient.    Advance Care Planning   ACP discussion was held with the patient during this visit. Patient does not have an advance directive, declines further assistance.                         MEDS ORDERED DURING VISIT:  New Medications Ordered This Visit   Medications    flecainide (TAMBOCOR) 100 MG tablet     Sig: Take 1 tablet by mouth 2 (Two) Times a Day.     Dispense:  180 tablet     Refill:  3     appt needed           This document has been electronically signed by MUMTAZ Morgan Jr.  November 18, 2024 14:11 EST

## 2025-01-10 DIAGNOSIS — I48.91 ATRIAL FIBRILLATION WITH RVR: ICD-10-CM

## 2025-01-10 RX ORDER — FLECAINIDE ACETATE 100 MG/1
100 TABLET ORAL 2 TIMES DAILY
Qty: 180 TABLET | Refills: 3 | Status: SHIPPED | OUTPATIENT
Start: 2025-01-10

## 2025-05-23 ENCOUNTER — OFFICE VISIT (OUTPATIENT)
Dept: CARDIOLOGY | Facility: CLINIC | Age: 70
End: 2025-05-23
Payer: COMMERCIAL

## 2025-05-23 VITALS
SYSTOLIC BLOOD PRESSURE: 138 MMHG | WEIGHT: 135.8 LBS | HEIGHT: 72 IN | DIASTOLIC BLOOD PRESSURE: 77 MMHG | OXYGEN SATURATION: 95 % | BODY MASS INDEX: 18.39 KG/M2

## 2025-05-23 DIAGNOSIS — R00.2 PALPITATIONS: ICD-10-CM

## 2025-05-23 DIAGNOSIS — I49.5 SICK SINUS SYNDROME: ICD-10-CM

## 2025-05-23 DIAGNOSIS — I48.91 ATRIAL FIBRILLATION, UNSPECIFIED TYPE: ICD-10-CM

## 2025-05-23 DIAGNOSIS — E78.2 MIXED HYPERLIPIDEMIA: ICD-10-CM

## 2025-05-23 DIAGNOSIS — I49.5 SICK SINUS SYNDROME: Primary | ICD-10-CM

## 2025-05-23 DIAGNOSIS — Z95.0 PRESENCE OF CARDIAC PACEMAKER: ICD-10-CM

## 2025-05-23 DIAGNOSIS — I10 PRIMARY HYPERTENSION: ICD-10-CM

## 2025-05-23 DIAGNOSIS — I48.0 PAROXYSMAL ATRIAL FIBRILLATION: Primary | ICD-10-CM

## 2025-05-23 RX ORDER — METOPROLOL SUCCINATE 100 MG/1
100 TABLET, EXTENDED RELEASE ORAL DAILY
Qty: 90 TABLET | Refills: 3 | Status: SHIPPED | OUTPATIENT
Start: 2025-05-23

## 2025-05-23 RX ORDER — DILTIAZEM HYDROCHLORIDE 180 MG/1
180 CAPSULE, EXTENDED RELEASE ORAL DAILY
Qty: 90 CAPSULE | Refills: 3 | Status: SHIPPED | OUTPATIENT
Start: 2025-05-23

## 2025-05-23 NOTE — PROGRESS NOTES
Subjective     Jonah Nuñez is a 69 y.o. male who presents to day for Atrial Fibrillation.    CHIEF COMPLIANT  Chief Complaint   Patient presents with    Atrial Fibrillation       Active Problems:  Problem List Items Addressed This Visit          Cardiac and Vasculature    Atrial fibrillation - Primary    Mixed hyperlipidemia     Other Visit Diagnoses         Sick sinus syndrome          Presence of cardiac pacemaker          Primary hypertension            1.  Paroxysmal atrial fibrillation currently on flecainide therapy  1.1 echocardiogram 5/24: EF greater than or equal to 50%, grade 1A diastolic dysfunction, trivial MR and mild TR, aortic root dilated at 4 cm, RVSP in the high 30s  2.  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  Preserved systolic function  4.  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 He has been not been able to do DOAC therapy due to the fact that he is on Dilantin and there is a contraindication as well.    Patient also has chronic arterial hypertension in which he is on metoprolol and diltiazem.  Today's blood pressure is 138/77 heart rate of 60.    Patient does have a pacemaker in place.  We did review his interrogation which was performed today.  He is atrially paced 41% of the time and ventricularly paced 10% of the time.  He has 3.5 years of battery life remaining.  He had 24,000 mode switches with the max being 35 hours 49 minutes.  Total of 11% A-fib.  48 nonsustained ventricular tachycardia triggers which actually appears to be A-fib with RVR..    Patient reports that he had the flu dated January  to February and had a lot of flareups of A-fib at that time.    Overall patient's major complaint is fatigue in which he gets tired easily.  He says for example he goes into [employer] gets a cart and stops to talk to somebody he will clamp up and get sweaty from exertion.    Patient denies any chest pain, shortness of breath, lower extremity edema,  syncope, PND, orthopnea, or strokelike symptoms.  PRIOR MEDS  Current Outpatient Medications on File Prior to Visit   Medication Sig Dispense Refill    amitriptyline (ELAVIL) 25 MG tablet Take 1 tablet by mouth Every Night.      aspirin 81 MG tablet Take 1 tablet by mouth.      dilTIAZem XR (DILACOR XR) 180 MG 24 hr capsule Take 1 capsule by mouth Daily. 90 capsule 3    flecainide (TAMBOCOR) 100 MG tablet TAKE 1 TABLET BY MOUTH TWICE A  tablet 3    metoprolol succinate XL (TOPROL-XL) 100 MG 24 hr tablet TAKE 1 TABLET BY MOUTH EVERY DAY 90 tablet 3    phenytoin (DILANTIN) 100 MG ER capsule Take 4 capsules by mouth Daily. Takes differently at times      polyethylene glycol (MIRALAX) packet Take 17 g by mouth Daily.       No current facility-administered medications on file prior to visit.       ALLERGIES  Doxycycline    HISTORY  Past Medical History:   Diagnosis Date    Aneurysm     Atrial fibrillation     COVID 10/2024    Hyperlipidemia     Iliac artery aneurysm        Social History     Socioeconomic History    Marital status:    Tobacco Use    Smoking status: Some Days     Current packs/day: 0.25     Average packs/day: 0.3 packs/day for 30.0 years (7.5 ttl pk-yrs)     Types: Cigarettes     Start date: 5/23/1995    Smokeless tobacco: Former    Tobacco comments:     smokes approx. < 1/2 PPD for approx. 30 yrs.   Vaping Use    Vaping status: Never Used   Substance and Sexual Activity    Alcohol use: Yes     Comment: occas. beer    Drug use: Never    Sexual activity: Defer       Family History   Problem Relation Age of Onset    Lung cancer Mother      "Osteoporosis Mother     Heart attack Father     Coronary artery disease Father        Review of Systems   Constitutional:  Positive for fatigue. Negative for chills and fever.   HENT:  Positive for congestion. Negative for rhinorrhea and sore throat.    Eyes:  Negative for visual disturbance.   Respiratory:  Negative for apnea, chest tightness and shortness of breath.    Cardiovascular:  Negative for chest pain, palpitations and leg swelling.   Gastrointestinal:  Negative for constipation, diarrhea and nausea.   Musculoskeletal:  Positive for arthralgias and back pain. Negative for neck pain.   Skin:  Negative for rash and wound.   Allergic/Immunologic: Positive for environmental allergies. Negative for food allergies.   Neurological:  Positive for weakness (with over exertion). Negative for dizziness, syncope and light-headedness.   Hematological:  Bruises/bleeds easily (bruise).   Psychiatric/Behavioral:  Negative for sleep disturbance.        Objective     VITALS: /77 (BP Location: Right arm, Patient Position: Sitting, Cuff Size: Adult)   Ht 182.9 cm (72.01\")   Wt 61.6 kg (135 lb 12.8 oz)   SpO2 95%   BMI 18.41 kg/m²     LABS:   Lab Results (most recent)       None            IMAGING:   No Images in the past 120 days found..    EXAM:  Physical Exam  Vitals and nursing note reviewed.   Constitutional:       Appearance: He is well-developed.   HENT:      Head: Normocephalic.   Neck:      Thyroid: No thyroid mass.      Vascular: No carotid bruit or JVD.      Trachea: Trachea and phonation normal.   Cardiovascular:      Rate and Rhythm: Normal rate and regular rhythm.      Pulses:           Radial pulses are 2+ on the right side and 2+ on the left side.        Posterior tibial pulses are 2+ on the right side and 2+ on the left side.      Heart sounds: Normal heart sounds. No murmur heard.     No friction rub. No gallop.   Pulmonary:      Effort: Pulmonary effort is normal. No respiratory distress.      " Breath sounds: Normal breath sounds. No wheezing or rales.   Musculoskeletal:         General: No swelling. Normal range of motion.      Cervical back: Neck supple.   Skin:     General: Skin is warm and dry.      Capillary Refill: Capillary refill takes less than 2 seconds.      Findings: No rash.   Neurological:      Mental Status: He is alert and oriented to person, place, and time.   Psychiatric:         Speech: Speech normal.         Behavior: Behavior normal.         Thought Content: Thought content normal.         Judgment: Judgment normal.         Procedure     ECG 12 Lead    Date/Time: 5/23/2025 10:58 AM  Performed by: Brandon Ochoa APRN    Authorized by: Brandon Ochoa APRN  Comparison: compared with previous ECG from 11/18/2024  Rhythm: paced  Rate: normal  BPM: 67  Conduction: 1st degree AV block  QRS axis: normal  Comments: QTc 467 ms  No acute changes           Assessment & Plan    Diagnosis Plan   1. Paroxysmal atrial fibrillation        2. Sick sinus syndrome        3. Presence of cardiac pacemaker        4. Mixed hyperlipidemia        5. Primary hypertension        1.  Patient does have paroxysmal atrial fibrillation in which she had 11% burden with some RVR.  He feels that this is mainly associated when he had the flu.  Will continue his diltiazem and metoprolol at current dosing.  2.  We did interrogate patient's pacemaker today which shows 3.5 years of battery life remaining.  3.  Overall he seems to be doing well from the cardiovascular standpoint.  He denies any significant angina anginal equivalent symptoms.  Will continue to follow.  4.  Patient's blood pressure is controlled on current blood pressure medication regimen.  No medication changes are warranted at this time.  Patient advised to monitor blood pressure on a daily basis and report any persistent highs or lows.  Set goal blood pressure for patient at 130/80 or below.  5.  Informed of signs and symptoms of ACS and advised to seek  emergent treatment for any new worsening symptoms.  Patient also advised sooner follow-up as needed.  Also advised to follow-up with family doctor as needed  This note is dictated utilizing voice recognition software.  Although this record has been proof read, transcriptional errors may still be present. If questions occur regarding the content of this record please do not hesitate to call our office.  I have reviewed and confirmed the accuracy of the ROS as documented by the MA/LPN/RN MUMTAZ Morgan    No follow-ups on file.    Diagnoses and all orders for this visit:    1. Paroxysmal atrial fibrillation (Primary)    2. Sick sinus syndrome    3. Presence of cardiac pacemaker    4. Mixed hyperlipidemia    5. Primary hypertension    Other orders  -     ECG 12 Lead        Cy Savannah  reports that he has been smoking cigarettes. He started smoking about 30 years ago. He has a 7.5 pack-year smoking history. He has quit using smokeless tobacco. I have educated him on the risk of diseases from using tobacco products such as cancer, COPD, and heart disease.     I advised him to quit and he is not willing to quit.    I spent 5.5 minutes counseling the patient.       Advance Care Planning   ACP discussion was held with the patient during this visit. Patient does not have an advance directive, declines further assistance.        DO YOU VAPE OR USE SMOKELESS TOBACCO PRODUCTS?    BMI is >= 25 and <30. (Overweight) The following options were offered after discussion;: exercise counseling/recommendations and nutrition counseling/recommendations           MEDS ORDERED DURING VISIT:  No orders of the defined types were placed in this encounter.          This document has been electronically signed by MUMTAZ Morgan Jr.  May 23, 2025 11:09 EDT

## 2025-06-19 ENCOUNTER — TELEPHONE (OUTPATIENT)
Dept: CARDIOLOGY | Facility: CLINIC | Age: 70
End: 2025-06-19
Payer: COMMERCIAL

## 2025-06-19 NOTE — TELEPHONE ENCOUNTER
I called patient and went over the below information. He stated that he really feels that the A Fib found was due to illness. 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 He would like to wait  until his November appointment and discuss with  then.    Brandon Ochoa APRN to Me  (Selected Message)      6/19/25 12:40 PM  Note      Mr. Ma should see if he is willing to try Coumadin for anticoagulation for his atrial fibrillation.  I do believe we discussed this in the office but I did not see it in my note.  Where he is on Dilantin there is contraindications with DOAC therapy.  Can be closely monitored via INR where there is no way to measure Eliquis or Xarelto.  If she is willing to move forward please let me know and I will send in the prescriptions

## 2025-06-19 NOTE — TELEPHONE ENCOUNTER
Dr. Pretty's recommendations regarding the pt's 5/23/25 device interrogation is to ensure appropriate anticoagulation.

## 2025-06-19 NOTE — TELEPHONE ENCOUNTER
Mr. Ma should see if he is willing to try Coumadin for anticoagulation for his atrial fibrillation.  I do believe we discussed this in the office but I did not see it in my note.  Where he is on Dilantin there is contraindications with DOAC therapy.  Can be closely monitored via INR where there is no way to measure Eliquis or Xarelto.  If she is willing to move forward please let me know and I will send in the prescriptions

## 2025-08-18 LAB
MC_CV_MDC_IDC_RATE_1: 160
MC_CV_MDC_IDC_ZONE_ID: 1
MDC_IDC_MSMT_BATTERY_REMAINING_LONGEVITY: 42 MO
MDC_IDC_MSMT_BATTERY_REMAINING_PERCENTAGE: 64 %
MDC_IDC_MSMT_BATTERY_STATUS: NORMAL
MDC_IDC_MSMT_LEADCHNL_RA_DTM: NORMAL
MDC_IDC_MSMT_LEADCHNL_RA_IMPEDANCE_VALUE: 681
MDC_IDC_MSMT_LEADCHNL_RA_PACING_THRESHOLD_AMPLITUDE: 0.6
MDC_IDC_MSMT_LEADCHNL_RA_PACING_THRESHOLD_POLARITY: NORMAL
MDC_IDC_MSMT_LEADCHNL_RA_PACING_THRESHOLD_PULSEWIDTH: 0.4
MDC_IDC_MSMT_LEADCHNL_RA_SENSING_INTR_AMPL: 5
MDC_IDC_MSMT_LEADCHNL_RV_DTM: NORMAL
MDC_IDC_MSMT_LEADCHNL_RV_IMPEDANCE_VALUE: 601
MDC_IDC_MSMT_LEADCHNL_RV_PACING_THRESHOLD_AMPLITUDE: 1.5
MDC_IDC_MSMT_LEADCHNL_RV_PACING_THRESHOLD_POLARITY: NORMAL
MDC_IDC_MSMT_LEADCHNL_RV_PACING_THRESHOLD_PULSEWIDTH: 0.4
MDC_IDC_MSMT_LEADCHNL_RV_SENSING_INTR_AMPL: 16.2
MDC_IDC_PG_IMPLANT_DTM: NORMAL
MDC_IDC_PG_MFG: NORMAL
MDC_IDC_PG_MODEL: NORMAL
MDC_IDC_PG_SERIAL: NORMAL
MDC_IDC_PG_TYPE: NORMAL
MDC_IDC_SESS_DTM: NORMAL
MDC_IDC_SESS_TYPE: NORMAL
MDC_IDC_SET_BRADY_AT_MODE_SWITCH_RATE: 170
MDC_IDC_SET_BRADY_LOWRATE: 60
MDC_IDC_SET_BRADY_MAX_SENSOR_RATE: 130
MDC_IDC_SET_BRADY_MAX_TRACKING_RATE: 130
MDC_IDC_SET_BRADY_MODE: NORMAL
MDC_IDC_SET_BRADY_PAV_DELAY: 200
MDC_IDC_SET_BRADY_SAV_DELAY: 185
MDC_IDC_SET_LEADCHNL_RA_PACING_AMPLITUDE: 2
MDC_IDC_SET_LEADCHNL_RA_PACING_POLARITY: NORMAL
MDC_IDC_SET_LEADCHNL_RA_PACING_PULSEWIDTH: 0.4
MDC_IDC_SET_LEADCHNL_RA_SENSING_POLARITY: NORMAL
MDC_IDC_SET_LEADCHNL_RA_SENSING_SENSITIVITY: 0.25
MDC_IDC_SET_LEADCHNL_RV_PACING_AMPLITUDE: 2.2
MDC_IDC_SET_LEADCHNL_RV_PACING_POLARITY: NORMAL
MDC_IDC_SET_LEADCHNL_RV_PACING_PULSEWIDTH: 0.4
MDC_IDC_SET_LEADCHNL_RV_SENSING_POLARITY: NORMAL
MDC_IDC_SET_LEADCHNL_RV_SENSING_SENSITIVITY: 0.6
MDC_IDC_SET_ZONE_STATUS: NORMAL
MDC_IDC_SET_ZONE_TYPE: NORMAL
MDC_IDC_STAT_AT_BURDEN_PERCENT: 4
MDC_IDC_STAT_BRADY_RA_PERCENT_PACED: 57
MDC_IDC_STAT_BRADY_RV_PERCENT_PACED: 8